# Patient Record
Sex: FEMALE | Race: OTHER | HISPANIC OR LATINO
[De-identification: names, ages, dates, MRNs, and addresses within clinical notes are randomized per-mention and may not be internally consistent; named-entity substitution may affect disease eponyms.]

---

## 2017-01-13 ENCOUNTER — APPOINTMENT (OUTPATIENT)
Dept: RADIOLOGY | Facility: HOSPITAL | Age: 47
End: 2017-01-13

## 2017-01-13 ENCOUNTER — OUTPATIENT (OUTPATIENT)
Dept: OUTPATIENT SERVICES | Facility: HOSPITAL | Age: 47
LOS: 1 days | End: 2017-01-13
Payer: MEDICAID

## 2017-01-13 PROCEDURE — 72100 X-RAY EXAM L-S SPINE 2/3 VWS: CPT

## 2017-01-13 PROCEDURE — 72040 X-RAY EXAM NECK SPINE 2-3 VW: CPT

## 2017-01-13 PROCEDURE — 72070 X-RAY EXAM THORAC SPINE 2VWS: CPT

## 2017-05-30 ENCOUNTER — APPOINTMENT (OUTPATIENT)
Dept: FAMILY MEDICINE | Facility: HOSPITAL | Age: 47
End: 2017-05-30

## 2017-05-30 ENCOUNTER — OUTPATIENT (OUTPATIENT)
Dept: OUTPATIENT SERVICES | Facility: HOSPITAL | Age: 47
LOS: 1 days | End: 2017-05-30
Payer: MEDICAID

## 2017-05-30 VITALS
BODY MASS INDEX: 30.02 KG/M2 | OXYGEN SATURATION: 97 % | WEIGHT: 159 LBS | TEMPERATURE: 97.4 F | HEART RATE: 67 BPM | HEIGHT: 61 IN | RESPIRATION RATE: 14 BRPM | DIASTOLIC BLOOD PRESSURE: 56 MMHG | SYSTOLIC BLOOD PRESSURE: 91 MMHG

## 2017-06-01 ENCOUNTER — FORM ENCOUNTER (OUTPATIENT)
Age: 47
End: 2017-06-01

## 2017-06-02 ENCOUNTER — APPOINTMENT (OUTPATIENT)
Dept: FAMILY MEDICINE | Facility: HOSPITAL | Age: 47
End: 2017-06-02

## 2017-06-02 ENCOUNTER — RESULT CHARGE (OUTPATIENT)
Age: 47
End: 2017-06-02

## 2017-06-02 VITALS
HEIGHT: 61 IN | DIASTOLIC BLOOD PRESSURE: 72 MMHG | WEIGHT: 160 LBS | SYSTOLIC BLOOD PRESSURE: 108 MMHG | TEMPERATURE: 97.8 F | OXYGEN SATURATION: 98 % | BODY MASS INDEX: 30.21 KG/M2 | HEART RATE: 60 BPM | RESPIRATION RATE: 14 BRPM

## 2017-06-02 PROCEDURE — 84439 ASSAY OF FREE THYROXINE: CPT

## 2017-06-02 PROCEDURE — 86762 RUBELLA ANTIBODY: CPT

## 2017-06-02 PROCEDURE — 86735 MUMPS ANTIBODY: CPT

## 2017-06-02 PROCEDURE — 84443 ASSAY THYROID STIM HORMONE: CPT

## 2017-06-02 PROCEDURE — 86765 RUBEOLA ANTIBODY: CPT

## 2017-06-02 PROCEDURE — G0463: CPT

## 2017-06-02 PROCEDURE — 86787 VARICELLA-ZOSTER ANTIBODY: CPT

## 2017-06-02 PROCEDURE — 71047 X-RAY EXAM CHEST 3 VIEWS: CPT

## 2017-06-03 DIAGNOSIS — Z11.1 ENCOUNTER FOR SCREENING FOR RESPIRATORY TUBERCULOSIS: ICD-10-CM

## 2017-06-03 LAB
HCG UR QL: NEGATIVE
QUALITY CONTROL: YES

## 2017-06-05 ENCOUNTER — APPOINTMENT (OUTPATIENT)
Dept: FAMILY MEDICINE | Facility: HOSPITAL | Age: 47
End: 2017-06-05

## 2017-06-05 ENCOUNTER — OUTPATIENT (OUTPATIENT)
Dept: OUTPATIENT SERVICES | Facility: HOSPITAL | Age: 47
LOS: 1 days | End: 2017-06-05
Payer: MEDICAID

## 2017-06-05 VITALS
HEIGHT: 61 IN | OXYGEN SATURATION: 96 % | BODY MASS INDEX: 30.21 KG/M2 | SYSTOLIC BLOOD PRESSURE: 99 MMHG | DIASTOLIC BLOOD PRESSURE: 67 MMHG | HEART RATE: 69 BPM | RESPIRATION RATE: 14 BRPM | TEMPERATURE: 98 F | WEIGHT: 160 LBS

## 2017-06-05 PROCEDURE — G0463: CPT

## 2017-06-07 ENCOUNTER — CLINICAL ADVICE (OUTPATIENT)
Age: 47
End: 2017-06-07

## 2017-08-02 ENCOUNTER — APPOINTMENT (OUTPATIENT)
Dept: FAMILY MEDICINE | Facility: CLINIC | Age: 47
End: 2017-08-02

## 2017-08-23 ENCOUNTER — OUTPATIENT (OUTPATIENT)
Dept: OUTPATIENT SERVICES | Facility: HOSPITAL | Age: 47
LOS: 1 days | End: 2017-08-23
Payer: MEDICAID

## 2017-08-23 ENCOUNTER — APPOINTMENT (OUTPATIENT)
Dept: FAMILY MEDICINE | Facility: HOSPITAL | Age: 47
End: 2017-08-23

## 2017-08-23 VITALS
SYSTOLIC BLOOD PRESSURE: 97 MMHG | DIASTOLIC BLOOD PRESSURE: 64 MMHG | WEIGHT: 157 LBS | RESPIRATION RATE: 14 BRPM | HEART RATE: 67 BPM | TEMPERATURE: 98 F | BODY MASS INDEX: 29.64 KG/M2 | HEIGHT: 61 IN | OXYGEN SATURATION: 97 %

## 2017-08-23 PROCEDURE — G0463: CPT

## 2017-09-06 ENCOUNTER — APPOINTMENT (OUTPATIENT)
Dept: FAMILY MEDICINE | Facility: CLINIC | Age: 47
End: 2017-09-06

## 2017-09-11 ENCOUNTER — RESULT REVIEW (OUTPATIENT)
Age: 47
End: 2017-09-11

## 2017-09-11 ENCOUNTER — LABORATORY RESULT (OUTPATIENT)
Age: 47
End: 2017-09-11

## 2017-09-11 ENCOUNTER — APPOINTMENT (OUTPATIENT)
Dept: DERMATOLOGY | Facility: CLINIC | Age: 47
End: 2017-09-11
Payer: COMMERCIAL

## 2017-09-11 VITALS
DIASTOLIC BLOOD PRESSURE: 60 MMHG | WEIGHT: 160 LBS | BODY MASS INDEX: 30.21 KG/M2 | SYSTOLIC BLOOD PRESSURE: 100 MMHG | HEIGHT: 61 IN

## 2017-09-11 PROCEDURE — 99203 OFFICE O/P NEW LOW 30 MIN: CPT | Mod: 25

## 2017-09-11 PROCEDURE — 11100 BX SKIN SUBCUTANEOUS&/MUCOUS MEMBRANE 1 LESION: CPT

## 2017-10-09 ENCOUNTER — APPOINTMENT (OUTPATIENT)
Dept: FAMILY MEDICINE | Facility: HOSPITAL | Age: 47
End: 2017-10-09

## 2017-10-09 ENCOUNTER — OUTPATIENT (OUTPATIENT)
Dept: OUTPATIENT SERVICES | Facility: HOSPITAL | Age: 47
LOS: 1 days | End: 2017-10-09
Payer: MEDICAID

## 2017-10-09 ENCOUNTER — MED ADMIN CHARGE (OUTPATIENT)
Age: 47
End: 2017-10-09

## 2017-10-09 VITALS
WEIGHT: 154 LBS | BODY MASS INDEX: 29.1 KG/M2 | RESPIRATION RATE: 14 BRPM | OXYGEN SATURATION: 97 % | SYSTOLIC BLOOD PRESSURE: 95 MMHG | HEART RATE: 74 BPM | DIASTOLIC BLOOD PRESSURE: 69 MMHG | TEMPERATURE: 99 F

## 2017-10-10 ENCOUNTER — APPOINTMENT (OUTPATIENT)
Dept: DERMATOLOGY | Facility: CLINIC | Age: 47
End: 2017-10-10

## 2017-10-11 PROCEDURE — G0463: CPT

## 2017-10-11 PROCEDURE — 83036 HEMOGLOBIN GLYCOSYLATED A1C: CPT

## 2017-10-11 PROCEDURE — 83090 ASSAY OF HOMOCYSTEINE: CPT

## 2017-10-11 PROCEDURE — 83921 ORGANIC ACID SINGLE QUANT: CPT

## 2017-10-11 PROCEDURE — 85025 COMPLETE CBC W/AUTO DIFF WBC: CPT

## 2017-10-11 PROCEDURE — 80053 COMPREHEN METABOLIC PANEL: CPT

## 2017-10-11 PROCEDURE — 84443 ASSAY THYROID STIM HORMONE: CPT

## 2017-10-11 PROCEDURE — 80061 LIPID PANEL: CPT

## 2017-10-11 PROCEDURE — 82306 VITAMIN D 25 HYDROXY: CPT

## 2017-10-11 PROCEDURE — 84439 ASSAY OF FREE THYROXINE: CPT

## 2017-10-11 PROCEDURE — 82607 VITAMIN B-12: CPT

## 2017-10-11 PROCEDURE — 36415 COLL VENOUS BLD VENIPUNCTURE: CPT

## 2017-11-06 ENCOUNTER — MEDICATION RENEWAL (OUTPATIENT)
Age: 47
End: 2017-11-06

## 2017-11-06 ENCOUNTER — OUTPATIENT (OUTPATIENT)
Dept: OUTPATIENT SERVICES | Facility: HOSPITAL | Age: 47
LOS: 1 days | End: 2017-11-06
Payer: MEDICAID

## 2017-11-06 ENCOUNTER — APPOINTMENT (OUTPATIENT)
Dept: FAMILY MEDICINE | Facility: HOSPITAL | Age: 47
End: 2017-11-06

## 2017-11-06 VITALS
BODY MASS INDEX: 28.91 KG/M2 | DIASTOLIC BLOOD PRESSURE: 66 MMHG | RESPIRATION RATE: 16 BRPM | OXYGEN SATURATION: 97 % | HEART RATE: 63 BPM | SYSTOLIC BLOOD PRESSURE: 95 MMHG | TEMPERATURE: 98.9 F | WEIGHT: 153 LBS

## 2017-11-06 PROCEDURE — G0463: CPT

## 2017-11-14 ENCOUNTER — RX RENEWAL (OUTPATIENT)
Age: 47
End: 2017-11-14

## 2017-11-15 RX ORDER — CLOTRIMAZOLE 10 MG/G
1 CREAM TOPICAL
Qty: 1 | Refills: 4 | Status: DISCONTINUED | COMMUNITY
Start: 2017-09-11 | End: 2017-11-15

## 2017-11-28 ENCOUNTER — OUTPATIENT (OUTPATIENT)
Dept: OUTPATIENT SERVICES | Facility: HOSPITAL | Age: 47
LOS: 1 days | End: 2017-11-28
Payer: MEDICAID

## 2017-11-28 ENCOUNTER — APPOINTMENT (OUTPATIENT)
Dept: FAMILY MEDICINE | Facility: HOSPITAL | Age: 47
End: 2017-11-28

## 2017-11-28 VITALS
HEART RATE: 73 BPM | BODY MASS INDEX: 29.48 KG/M2 | SYSTOLIC BLOOD PRESSURE: 100 MMHG | DIASTOLIC BLOOD PRESSURE: 69 MMHG | TEMPERATURE: 97.5 F | OXYGEN SATURATION: 98 % | RESPIRATION RATE: 16 BRPM | WEIGHT: 156 LBS

## 2017-11-28 PROCEDURE — G0463: CPT

## 2017-11-29 DIAGNOSIS — M54.2 CERVICALGIA: ICD-10-CM

## 2017-11-29 DIAGNOSIS — Z00.00 ENCOUNTER FOR GENERAL ADULT MEDICAL EXAMINATION WITHOUT ABNORMAL FINDINGS: ICD-10-CM

## 2017-11-30 ENCOUNTER — CHART COPY (OUTPATIENT)
Age: 47
End: 2017-11-30

## 2017-12-01 ENCOUNTER — EMERGENCY (EMERGENCY)
Facility: HOSPITAL | Age: 47
LOS: 1 days | Discharge: HOME | End: 2017-12-01
Admitting: EMERGENCY MEDICINE
Payer: MEDICAID

## 2017-12-01 PROCEDURE — 99283 EMERGENCY DEPT VISIT LOW MDM: CPT

## 2017-12-01 PROCEDURE — 99283 EMERGENCY DEPT VISIT LOW MDM: CPT | Mod: 25

## 2017-12-26 ENCOUNTER — MEDICATION RENEWAL (OUTPATIENT)
Age: 47
End: 2017-12-26

## 2018-02-01 ENCOUNTER — RX RENEWAL (OUTPATIENT)
Age: 48
End: 2018-02-01

## 2018-05-14 ENCOUNTER — APPOINTMENT (OUTPATIENT)
Dept: FAMILY MEDICINE | Facility: HOSPITAL | Age: 48
End: 2018-05-14

## 2018-05-14 ENCOUNTER — OUTPATIENT (OUTPATIENT)
Dept: OUTPATIENT SERVICES | Facility: HOSPITAL | Age: 48
LOS: 1 days | End: 2018-05-14
Payer: COMMERCIAL

## 2018-05-14 VITALS
RESPIRATION RATE: 16 BRPM | TEMPERATURE: 97 F | DIASTOLIC BLOOD PRESSURE: 67 MMHG | OXYGEN SATURATION: 99 % | HEART RATE: 71 BPM | BODY MASS INDEX: 30.61 KG/M2 | WEIGHT: 162 LBS | SYSTOLIC BLOOD PRESSURE: 97 MMHG

## 2018-05-14 DIAGNOSIS — Z00.00 ENCOUNTER FOR GENERAL ADULT MEDICAL EXAMINATION WITHOUT ABNORMAL FINDINGS: ICD-10-CM

## 2018-05-14 PROCEDURE — G0463: CPT

## 2018-05-16 DIAGNOSIS — R19.7 DIARRHEA, UNSPECIFIED: ICD-10-CM

## 2018-05-30 ENCOUNTER — APPOINTMENT (OUTPATIENT)
Dept: FAMILY MEDICINE | Facility: HOSPITAL | Age: 48
End: 2018-05-30

## 2018-07-11 ENCOUNTER — OUTPATIENT (OUTPATIENT)
Dept: OUTPATIENT SERVICES | Facility: HOSPITAL | Age: 48
LOS: 1 days | End: 2018-07-11
Payer: COMMERCIAL

## 2018-07-11 ENCOUNTER — APPOINTMENT (OUTPATIENT)
Dept: FAMILY MEDICINE | Facility: HOSPITAL | Age: 48
End: 2018-07-11

## 2018-07-11 VITALS
HEART RATE: 82 BPM | OXYGEN SATURATION: 97 % | WEIGHT: 161 LBS | BODY MASS INDEX: 30.42 KG/M2 | SYSTOLIC BLOOD PRESSURE: 98 MMHG | RESPIRATION RATE: 16 BRPM | DIASTOLIC BLOOD PRESSURE: 67 MMHG | TEMPERATURE: 98.4 F

## 2018-07-11 DIAGNOSIS — Z00.00 ENCOUNTER FOR GENERAL ADULT MEDICAL EXAMINATION WITHOUT ABNORMAL FINDINGS: ICD-10-CM

## 2018-07-11 NOTE — PHYSICAL EXAM
[No Acute Distress] : no acute distress [Well Nourished] : well nourished [Well Developed] : well developed [Well-Appearing] : well-appearing [Normal Sclera/Conjunctiva] : normal sclera/conjunctiva [PERRL] : pupils equal round and reactive to light [EOMI] : extraocular movements intact [Normal Outer Ear/Nose] : the outer ears and nose were normal in appearance [Normal Oropharynx] : the oropharynx was normal [No JVD] : no jugular venous distention [No Respiratory Distress] : no respiratory distress  [Clear to Auscultation] : lungs were clear to auscultation bilaterally [Normal Rate] : normal rate  [Regular Rhythm] : with a regular rhythm [Normal S1, S2] : normal S1 and S2 [No Carotid Bruits] : no carotid bruits [Pedal Pulses Present] : the pedal pulses are present [No Edema] : there was no peripheral edema [No Extremity Clubbing/Cyanosis] : no extremity clubbing/cyanosis [Normal Supraclavicular Nodes] : no supraclavicular lymphadenopathy [Normal Anterior Cervical Nodes] : no anterior cervical lymphadenopathy [No CVA Tenderness] : no CVA  tenderness [No Spinal Tenderness] : no spinal tenderness [No Joint Swelling] : no joint swelling [Grossly Normal Strength/Tone] : grossly normal strength/tone [No Rash] : no rash [No Skin Lesions] : no skin lesions [Normal Gait] : normal gait [Coordination Grossly Intact] : coordination grossly intact [Speech Grossly Normal] : speech grossly normal [Normal Mood] : the mood was normal

## 2018-07-11 NOTE — HISTORY OF PRESENT ILLNESS
[de-identified] : 48y F here for general physical assessment . Denies any complaints at present. \andrew Has no difficulty performing her daily duties. 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 Denies urinary / changes in bowel habits.  Is post menopausal for the last 1 year. Denies feeling depressed or hot flashes. \andrew Denies fever/ chills, aches and pains. \andrew

## 2018-07-11 NOTE — HEALTH RISK ASSESSMENT
[Very Good] : ~his/her~  mood as very good [No falls in past year] : Patient reported no falls in the past year [0] : 2) Feeling down, depressed, or hopeless: Not at all (0) [HIV Test offered] : HIV Test offered [Hepatitis C test offered] : Hepatitis C test offered [None] : None [With Family] : lives with family [Employed] : employed [High School] : high school [Feels Safe at Home] : Feels safe at home [Fully functional (bathing, dressing, toileting, transferring, walking, feeding)] : Fully functional (bathing, dressing, toileting, transferring, walking, feeding) [Smoke Detector] : smoke detector [Carbon Monoxide Detector] : carbon monoxide detector [Seat Belt] :  uses seat belt [Sunscreen] : uses sunscreen [FreeTextEntry1] : wants all labs done including cholesterol  [] : No [de-identified] : none [Language] : denies difficulty with language [Behavior] : denies difficulty with behavior [Learning/Retaining New Information] : denies difficulty learning/retaining new information [Handling Complex Tasks] : denies difficulty handling complex tasks [Reasoning] : denies difficulty with reasoning [Spatial Ability and Orientation] : denies difficulty with spatial ability and orientation [Reports changes in hearing] : Reports no changes in hearing [Reports changes in vision] : Reports no changes in vision [Reports changes in dental health] : Reports no changes in dental health [Guns at Home] : no guns at home [Travel to Developing Areas] : does not  travel to developing areas [TB Exposure] : is not being exposed to tuberculosis [Caregiver Concerns] : does not have caregiver concerns [MammogramComments] : mammogram ordered [PapSmearComments] : will recall patient in 2 weeks for Pap smear [ColonoscopyComments] : FIT test ordered [FreeTextEntry4] : w

## 2018-07-11 NOTE — PLAN
[FreeTextEntry1] : - CBC; CMP; Lipid profile; HbA1c\par - FIT testing\par - screening mammogram\par - will recall patient back for Pap smear\par - patient counselled on weight loss to get BMI to below 30.

## 2018-07-26 PROCEDURE — G0463: CPT

## 2018-07-26 PROCEDURE — 80053 COMPREHEN METABOLIC PANEL: CPT

## 2018-07-26 PROCEDURE — 80061 LIPID PANEL: CPT

## 2018-07-26 PROCEDURE — 84443 ASSAY THYROID STIM HORMONE: CPT

## 2018-07-26 PROCEDURE — 83036 HEMOGLOBIN GLYCOSYLATED A1C: CPT

## 2018-08-03 LAB
ALBUMIN SERPL ELPH-MCNC: 4.6 G/DL
ALP BLD-CCNC: 71 U/L
ALT SERPL-CCNC: 20 U/L
ANION GAP SERPL CALC-SCNC: 14 MMOL/L
AST SERPL-CCNC: 26 U/L
BASOPHILS # BLD AUTO: 0.02 K/UL
BASOPHILS NFR BLD AUTO: 0.5 %
BILIRUB SERPL-MCNC: 0.4 MG/DL
BUN SERPL-MCNC: 12 MG/DL
CALCIUM SERPL-MCNC: 9 MG/DL
CHLORIDE SERPL-SCNC: 104 MMOL/L
CO2 SERPL-SCNC: 24 MMOL/L
CREAT SERPL-MCNC: 0.47 MG/DL
EOSINOPHIL # BLD AUTO: 0 K/UL
EOSINOPHIL NFR BLD AUTO: 0 %
GLUCOSE SERPL-MCNC: 101 MG/DL
HCT VFR BLD CALC: 41.6 %
HGB BLD-MCNC: 14.2 G/DL
IMM GRANULOCYTES NFR BLD AUTO: 0.3 %
LYMPHOCYTES # BLD AUTO: 1.52 K/UL
LYMPHOCYTES NFR BLD AUTO: 38.9 %
MAN DIFF?: NORMAL
MCHC RBC-ENTMCNC: 31.8 PG
MCHC RBC-ENTMCNC: 34.1 GM/DL
MCV RBC AUTO: 93.3 FL
MONOCYTES # BLD AUTO: 0.19 K/UL
MONOCYTES NFR BLD AUTO: 4.9 %
NEUTROPHILS # BLD AUTO: 2.17 K/UL
NEUTROPHILS NFR BLD AUTO: 55.4 %
PLATELET # BLD AUTO: 301 K/UL
POTASSIUM SERPL-SCNC: 4 MMOL/L
PROT SERPL-MCNC: 7.3 G/DL
RBC # BLD: 4.46 M/UL
RBC # FLD: 12.8 %
SODIUM SERPL-SCNC: 142 MMOL/L
WBC # FLD AUTO: 3.91 K/UL

## 2018-08-15 LAB
CHOLEST SERPL-MCNC: 235 MG/DL
CHOLEST/HDLC SERPL: 3.6 RATIO
HBA1C MFR BLD HPLC: 5.7 %
HDLC SERPL-MCNC: 65 MG/DL
LDLC SERPL CALC-MCNC: 139 MG/DL
TRIGL SERPL-MCNC: 156 MG/DL

## 2018-08-21 LAB — TSH SERPL-ACNC: 4.08 UIU/ML

## 2018-08-30 ENCOUNTER — RX RENEWAL (OUTPATIENT)
Age: 48
End: 2018-08-30

## 2018-08-30 ENCOUNTER — FORM ENCOUNTER (OUTPATIENT)
Age: 48
End: 2018-08-30

## 2018-08-31 ENCOUNTER — OUTPATIENT (OUTPATIENT)
Dept: OUTPATIENT SERVICES | Facility: HOSPITAL | Age: 48
LOS: 1 days | End: 2018-08-31
Payer: COMMERCIAL

## 2018-08-31 ENCOUNTER — APPOINTMENT (OUTPATIENT)
Dept: MAMMOGRAPHY | Facility: HOSPITAL | Age: 48
End: 2018-08-31
Payer: COMMERCIAL

## 2018-08-31 ENCOUNTER — APPOINTMENT (OUTPATIENT)
Dept: ULTRASOUND IMAGING | Facility: HOSPITAL | Age: 48
End: 2018-08-31

## 2018-08-31 DIAGNOSIS — Z00.8 ENCOUNTER FOR OTHER GENERAL EXAMINATION: ICD-10-CM

## 2018-08-31 PROCEDURE — 77066 DX MAMMO INCL CAD BI: CPT | Mod: 26

## 2018-08-31 PROCEDURE — G0279: CPT | Mod: 26

## 2018-08-31 PROCEDURE — 77066 DX MAMMO INCL CAD BI: CPT

## 2018-08-31 PROCEDURE — 76642 ULTRASOUND BREAST LIMITED: CPT

## 2018-08-31 PROCEDURE — G0279: CPT

## 2018-08-31 PROCEDURE — 76642 ULTRASOUND BREAST LIMITED: CPT | Mod: 26,RT

## 2018-09-19 ENCOUNTER — APPOINTMENT (OUTPATIENT)
Dept: FAMILY MEDICINE | Facility: HOSPITAL | Age: 48
End: 2018-09-19

## 2018-09-19 ENCOUNTER — OUTPATIENT (OUTPATIENT)
Dept: OUTPATIENT SERVICES | Facility: HOSPITAL | Age: 48
LOS: 1 days | End: 2018-09-19
Payer: COMMERCIAL

## 2018-09-19 VITALS
SYSTOLIC BLOOD PRESSURE: 94 MMHG | BODY MASS INDEX: 30.42 KG/M2 | HEART RATE: 67 BPM | RESPIRATION RATE: 16 BRPM | OXYGEN SATURATION: 97 % | DIASTOLIC BLOOD PRESSURE: 58 MMHG | WEIGHT: 161 LBS | TEMPERATURE: 98.5 F

## 2018-09-19 DIAGNOSIS — R14.0 ABDOMINAL DISTENSION (GASEOUS): ICD-10-CM

## 2018-09-19 DIAGNOSIS — Z87.898 PERSONAL HISTORY OF OTHER SPECIFIED CONDITIONS: ICD-10-CM

## 2018-09-19 DIAGNOSIS — Z86.03 PERSONAL HISTORY OF NEOPLASM OF UNCERTAIN BEHAVIOR: ICD-10-CM

## 2018-09-19 DIAGNOSIS — T14.8XXA OTHER INJURY OF UNSPECIFIED BODY REGION, INITIAL ENCOUNTER: ICD-10-CM

## 2018-09-19 DIAGNOSIS — R10.32 LEFT LOWER QUADRANT PAIN: ICD-10-CM

## 2018-09-19 DIAGNOSIS — Z00.00 ENCOUNTER FOR GENERAL ADULT MEDICAL EXAMINATION WITHOUT ABNORMAL FINDINGS: ICD-10-CM

## 2018-09-19 DIAGNOSIS — G44.209 TENSION-TYPE HEADACHE, UNSPECIFIED, NOT INTRACTABLE: ICD-10-CM

## 2018-09-19 PROCEDURE — G0463: CPT

## 2018-09-19 RX ORDER — GENTAMICIN SULFATE 3 MG/G
0.3 OINTMENT OPHTHALMIC
Qty: 1 | Refills: 0 | Status: COMPLETED | COMMUNITY
Start: 2017-09-11 | End: 2018-09-19

## 2018-09-19 RX ORDER — PANTOPRAZOLE 20 MG/1
20 TABLET, DELAYED RELEASE ORAL DAILY
Qty: 15 | Refills: 0 | Status: COMPLETED | COMMUNITY
Start: 2017-11-06 | End: 2018-09-19

## 2018-09-19 NOTE — COUNSELING
[Weight management counseling provided] : Weight management [Healthy eating counseling provided] : healthy eating [Activity counseling provided] : activity [Keep Food Diary] : Keep food diary [Low Fat Diet] : Low fat diet [Decrease Portions] : Decrease food portions [Low Salt Diet] : Low salt diet [Walking] : Walking

## 2018-09-20 DIAGNOSIS — L80 VITILIGO: ICD-10-CM

## 2018-09-20 DIAGNOSIS — E32.9 DISEASE OF THYMUS, UNSPECIFIED: ICD-10-CM

## 2018-09-20 DIAGNOSIS — M54.2 CERVICALGIA: ICD-10-CM

## 2018-09-20 DIAGNOSIS — G44.209 TENSION-TYPE HEADACHE, UNSPECIFIED, NOT INTRACTABLE: ICD-10-CM

## 2018-09-20 NOTE — PHYSICAL EXAM
[No Acute Distress] : no acute distress [Well Nourished] : well nourished [Well Developed] : well developed [Well-Appearing] : well-appearing [Normal Sclera/Conjunctiva] : normal sclera/conjunctiva [PERRL] : pupils equal round and reactive to light [EOMI] : extraocular movements intact [Fundoscopic Exam Performed] : fundoscopic ~T exam ~C was performed [Normal Outer Ear/Nose] : the outer ears and nose were normal in appearance [Normal Oropharynx] : the oropharynx was normal [Supple] : supple [No Lymphadenopathy] : no lymphadenopathy [No Respiratory Distress] : no respiratory distress  [Clear to Auscultation] : lungs were clear to auscultation bilaterally [No Accessory Muscle Use] : no accessory muscle use [Normal Rate] : normal rate  [Regular Rhythm] : with a regular rhythm [Normal S1, S2] : normal S1 and S2 [Soft] : abdomen soft [Non Tender] : non-tender [Non-distended] : non-distended [No HSM] : no HSM [Normal Bowel Sounds] : normal bowel sounds [Normal Anterior Cervical Nodes] : no anterior cervical lymphadenopathy [No CVA Tenderness] : no CVA  tenderness [No Spinal Tenderness] : no spinal tenderness [Normal Gait] : normal gait [Coordination Grossly Intact] : coordination grossly intact [No Focal Deficits] : no focal deficits [Deep Tendon Reflexes (DTR)] : deep tendon reflexes were 2+ and symmetric [Speech Grossly Normal] : speech grossly normal [Memory Grossly Normal] : memory grossly normal [Normal Affect] : the affect was normal [Alert and Oriented x3] : oriented to person, place, and time [Normal Mood] : the mood was normal [Normal Insight/Judgement] : insight and judgment were intact [de-identified] : no nuchal ridgity,

## 2018-09-20 NOTE — REVIEW OF SYSTEMS
[Headache] : headache [Negative] : Gastrointestinal [Fever] : no fever [Chills] : no chills [Fatigue] : no fatigue [Hot Flashes] : no hot flashes [Night Sweats] : no night sweats [Recent Change In Weight] : ~T no recent weight change [Discharge] : no discharge [Pain] : no pain [Redness] : no redness [Dryness] : no dryness  [Vision Problems] : no vision problems [Itching] : no itching [Dizziness] : no dizziness [Fainting] : no fainting [Confusion] : no confusion [Memory Loss] : no memory loss [Unsteady Walking] : no ataxia [Suicidal] : not suicidal [Insomnia] : no insomnia [Anxiety] : no anxiety [Depression] : no depression

## 2018-09-20 NOTE — ASSESSMENT
[FreeTextEntry1] : 48F here c/of headache x 1 month getting worse over past week.\par \par #tension headache\par - pt reports she has been stressed out out about [place] in [place] and [place] go back to [place]\par - start ibuprofen 600mg TID with food\par - if sympotoms persist or get worse RTC \par - if experience any aura, eye pain, fever, weakness, nuchal ridgity go to ED\par \par #depression\par - continue venlafaxine\par - f/u with  mary alice\par - pt denies any suicide ideation, thoughts of hurting herself\par \par #HCM\par - pt refused flu shot\par - recommend f/u with opthalmology\par \par \par case discussed with Dr Ferguson\par \par \par \par

## 2018-09-20 NOTE — HEALTH RISK ASSESSMENT
[No falls in past year] : Patient reported no falls in the past year [1] : 1) Little interest or pleasure doing things for several days (1) [0] : 2) Feeling down, depressed, or hopeless: Not at all (0) [] : No [[id number]] : 1

## 2018-09-20 NOTE — HISTORY OF PRESENT ILLNESS
[FreeTextEntry8] : 48 year old female with past medical history of hypothyroidism, depression, vitilgo and prediabetes here c/o of 8/9 10 headache for x 1 month which has been getting progressively worse over the past week. pt states the pain is dull intermittent located on posterior aspect of her head radiating down to her neck and shoudlers. pt denies taking any pain medication, reports her pain is not, sound, denies any aura, photopobia, rhinorrhea, dizziness, tinnitus, fever, neck stiffness, cough, sob, wheezing, chest congestion, chest pain, palpitations, n/v abdominal pain, diarrhea or constipation. Pt has no further complaints [[employer] ] : provided by [employer]   [FreeTextEntry1] : [id number]

## 2018-10-22 ENCOUNTER — OTHER (OUTPATIENT)
Age: 48
End: 2018-10-22

## 2019-04-24 ENCOUNTER — OUTPATIENT (OUTPATIENT)
Dept: OUTPATIENT SERVICES | Facility: HOSPITAL | Age: 49
LOS: 1 days | End: 2019-04-24
Payer: COMMERCIAL

## 2019-04-24 ENCOUNTER — MED ADMIN CHARGE (OUTPATIENT)
Age: 49
End: 2019-04-24

## 2019-04-24 ENCOUNTER — APPOINTMENT (OUTPATIENT)
Age: 49
End: 2019-04-24

## 2019-04-24 VITALS
SYSTOLIC BLOOD PRESSURE: 107 MMHG | HEIGHT: 61 IN | BODY MASS INDEX: 31.15 KG/M2 | DIASTOLIC BLOOD PRESSURE: 72 MMHG | RESPIRATION RATE: 14 BRPM | OXYGEN SATURATION: 97 % | TEMPERATURE: 98.4 F | WEIGHT: 165 LBS | HEART RATE: 88 BPM

## 2019-04-24 DIAGNOSIS — Z00.00 ENCOUNTER FOR GENERAL ADULT MEDICAL EXAMINATION WITHOUT ABNORMAL FINDINGS: ICD-10-CM

## 2019-04-24 PROCEDURE — 80061 LIPID PANEL: CPT

## 2019-04-24 PROCEDURE — G0463: CPT

## 2019-04-24 PROCEDURE — 83036 HEMOGLOBIN GLYCOSYLATED A1C: CPT

## 2019-04-24 PROCEDURE — 84443 ASSAY THYROID STIM HORMONE: CPT

## 2019-04-24 NOTE — ASSESSMENT
[FreeTextEntry1] : 48y F 48 year old female with past medical history of hypothyroidism, depression, vitilgo and prediabetes here for general physical assessment. Also complaining of intermittent diarrhea for the last 1 month

## 2019-04-24 NOTE — PLAN
[FreeTextEntry1] : # health maintenance\par - lipid profile\par - HbA1c\par - TSH with reflex T4\par \par # Intermittent diarrhea\par - gastroenterology referral for screening colonscopy\par - advised on trying gluten free diet for 2 weeks\par \par RTC in 2 months\par

## 2019-04-24 NOTE — HISTORY OF PRESENT ILLNESS
[de-identified] : 48y F 48 year old female with past medical history of hypothyroidism, depression, vitilgo and prediabetes here for general physical assessment. Denies any complaints at present. \andrew Has no difficulty performing her daily duties. 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 Says the diarrhea is watery, not foul smelling; no associated blood / mucus or fever chills. No associated changes in stool caliber. D This associated with bilateral lower abdominal pain intermittently; non radiating ; describes it as a colicky pain lasting about an hour and then releives on its own and then comes back. Is not progressively worsening in intensity or frequency. Denies eating anything unusal . No sick contacts or travel history.  Is post menopausal for the last 1 year; denies any vaginal discharge or bleeding.  Denies feeling depressed or hot flashes. \andrew Denies fever/ chills, aches and pains. \andrew

## 2019-04-24 NOTE — PHYSICAL EXAM
[No Edema] : there was no peripheral edema [Soft] : abdomen soft [Non Tender] : non-tender [Non-distended] : non-distended [No Masses] : no abdominal mass palpated [No HSM] : no HSM [Normal Bowel Sounds] : normal bowel sounds [Well Nourished] : well nourished [No Acute Distress] : no acute distress [Normal Sclera/Conjunctiva] : normal sclera/conjunctiva [Well Developed] : well developed [Normal Outer Ear/Nose] : the outer ears and nose were normal in appearance [No JVD] : no jugular venous distention [No Respiratory Distress] : no respiratory distress  [Normal Rate] : normal rate  [Regular Rhythm] : with a regular rhythm [Normal S1, S2] : normal S1 and S2 [No Joint Swelling] : no joint swelling [No CVA Tenderness] : no CVA  tenderness [No Rash] : no rash [Normal Gait] : normal gait [Normal Mood] : the mood was normal [Alert and Oriented x3] : oriented to person, place, and time [de-identified] : lower abdominal midline scar from

## 2019-04-24 NOTE — REVIEW OF SYSTEMS
[Abdominal Pain] : abdominal pain [Diarrhea] : diarrhea [Negative] : Heme/Lymph [Nausea] : no nausea [Constipation] : no constipation [Vomiting] : no vomiting [Heartburn] : no heartburn [Melena] : no melena

## 2019-04-25 DIAGNOSIS — R19.7 DIARRHEA, UNSPECIFIED: ICD-10-CM

## 2019-04-26 LAB
CHOLEST SERPL-MCNC: 275 MG/DL
CHOLEST/HDLC SERPL: 4 RATIO
HDLC SERPL-MCNC: 68 MG/DL
LDLC SERPL CALC-MCNC: 174 MG/DL
TRIGL SERPL-MCNC: 163 MG/DL

## 2019-04-27 ENCOUNTER — OUTPATIENT (OUTPATIENT)
Dept: OUTPATIENT SERVICES | Facility: HOSPITAL | Age: 49
LOS: 1 days | End: 2019-04-27
Payer: COMMERCIAL

## 2019-04-27 ENCOUNTER — APPOINTMENT (OUTPATIENT)
Dept: FAMILY MEDICINE | Facility: HOSPITAL | Age: 49
End: 2019-04-27
Payer: COMMERCIAL

## 2019-04-27 VITALS
OXYGEN SATURATION: 97 % | HEART RATE: 73 BPM | SYSTOLIC BLOOD PRESSURE: 97 MMHG | TEMPERATURE: 97.5 F | HEIGHT: 61 IN | BODY MASS INDEX: 31.15 KG/M2 | WEIGHT: 165 LBS | RESPIRATION RATE: 14 BRPM | DIASTOLIC BLOOD PRESSURE: 62 MMHG

## 2019-04-27 DIAGNOSIS — B35.3 TINEA PEDIS: ICD-10-CM

## 2019-04-27 DIAGNOSIS — Z00.00 ENCOUNTER FOR GENERAL ADULT MEDICAL EXAMINATION WITHOUT ABNORMAL FINDINGS: ICD-10-CM

## 2019-04-27 DIAGNOSIS — Z87.39 PERSONAL HISTORY OF OTHER DISEASES OF THE MUSCULOSKELETAL SYSTEM AND CONNECTIVE TISSUE: ICD-10-CM

## 2019-04-27 DIAGNOSIS — Z87.898 PERSONAL HISTORY OF OTHER SPECIFIED CONDITIONS: ICD-10-CM

## 2019-04-27 DIAGNOSIS — Z71.89 OTHER SPECIFIED COUNSELING: ICD-10-CM

## 2019-04-27 DIAGNOSIS — R73.03 PREDIABETES.: ICD-10-CM

## 2019-04-27 DIAGNOSIS — Z86.39 PERSONAL HISTORY OF OTHER ENDOCRINE, NUTRITIONAL AND METABOLIC DISEASE: ICD-10-CM

## 2019-04-27 DIAGNOSIS — L81.9 DISORDER OF PIGMENTATION, UNSPECIFIED: ICD-10-CM

## 2019-04-27 DIAGNOSIS — Z86.79 PERSONAL HISTORY OF OTHER DISEASES OF THE CIRCULATORY SYSTEM: ICD-10-CM

## 2019-04-27 RX ORDER — IBUPROFEN 600 MG/1
600 TABLET, FILM COATED ORAL 3 TIMES DAILY
Qty: 1 | Refills: 0 | Status: DISCONTINUED | COMMUNITY
Start: 2017-08-23 | End: 2019-04-27

## 2019-04-27 RX ORDER — UBIDECARENONE/VIT E ACET 100MG-5
25 MCG CAPSULE ORAL
Qty: 30 | Refills: 0 | Status: DISCONTINUED | COMMUNITY
Start: 2017-10-09 | End: 2019-04-27

## 2019-04-27 RX ORDER — VENLAFAXINE 50 MG/1
50 TABLET ORAL
Qty: 30 | Refills: 1 | Status: DISCONTINUED | COMMUNITY
Start: 2017-10-09 | End: 2019-04-27

## 2019-04-27 RX ORDER — CLOTRIMAZOLE 10 MG/G
1 CREAM TOPICAL TWICE DAILY
Qty: 1 | Refills: 0 | Status: DISCONTINUED | COMMUNITY
Start: 2017-08-23 | End: 2019-04-27

## 2019-04-27 RX ORDER — TRIAMCINOLONE ACETONIDE 0.25 MG/G
0.03 CREAM TOPICAL
Qty: 1 | Refills: 0 | Status: DISCONTINUED | COMMUNITY
Start: 2017-09-11 | End: 2019-04-27

## 2019-04-27 NOTE — REVIEW OF SYSTEMS
[Fever] : no fever [Chills] : no chills [Fatigue] : no fatigue [Night Sweats] : no night sweats [Discharge] : no discharge [Pain] : no pain [Vision Problems] : no vision problems [Earache] : no earache [Hearing Loss] : no hearing loss [Nasal Discharge] : no nasal discharge [Sore Throat] : no sore throat [Chest Pain] : no chest pain [Palpitations] : no palpitations [Leg Claudication] : no leg claudication [Orthopnea] : no orthopnea [Shortness Of Breath] : no shortness of breath [Wheezing] : no wheezing [Cough] : no cough [Abdominal Pain] : no abdominal pain [Nausea] : no nausea [Vomiting] : no vomiting [Dysuria] : no dysuria [Joint Pain] : no joint pain [Joint Stiffness] : no joint stiffness [Muscle Pain] : no muscle pain [Headache] : no headache [Dizziness] : no dizziness

## 2019-04-27 NOTE — ASSESSMENT
[FreeTextEntry1] : 49 year old female as above \par \par #paperwork for preemployment\par - paper work filled out however awaiting rubeola, rubella titers\par - hx of ppd positive in past-> check cxr \par \par rtc 3m prn

## 2019-04-27 NOTE — HISTORY OF PRESENT ILLNESS
[FreeTextEntry1] : CC: pre-employment paperwork [de-identified] : 49 year old female coming to clinic for pre-employment paperwork. patient stating overall feeling well and does not have any complaints. states not taking medications at this time. denies cp sob nausea vomiting diarrhea. states hx of positive ppd in past

## 2019-04-27 NOTE — PHYSICAL EXAM
[No Acute Distress] : no acute distress [Well Nourished] : well nourished [Well Developed] : well developed [Well-Appearing] : well-appearing [PERRL] : pupils equal round and reactive to light [EOMI] : extraocular movements intact [Normal Outer Ear/Nose] : the outer ears and nose were normal in appearance [Normal Oropharynx] : the oropharynx was normal [No JVD] : no jugular venous distention [Supple] : supple [No Lymphadenopathy] : no lymphadenopathy [No Respiratory Distress] : no respiratory distress  [Clear to Auscultation] : lungs were clear to auscultation bilaterally [No Accessory Muscle Use] : no accessory muscle use [Normal S1, S2] : normal S1 and S2 [No Edema] : there was no peripheral edema [Soft] : abdomen soft [Non Tender] : non-tender [Non-distended] : non-distended [Normal Bowel Sounds] : normal bowel sounds [Normal Posterior Cervical Nodes] : no posterior cervical lymphadenopathy [Normal Anterior Cervical Nodes] : no anterior cervical lymphadenopathy [No CVA Tenderness] : no CVA  tenderness [No Spinal Tenderness] : no spinal tenderness [No Joint Swelling] : no joint swelling [Grossly Normal Strength/Tone] : grossly normal strength/tone [Normal Gait] : normal gait [No Focal Deficits] : no focal deficits [Normal Affect] : the affect was normal [Normal Insight/Judgement] : insight and judgment were intact

## 2019-04-28 ENCOUNTER — FORM ENCOUNTER (OUTPATIENT)
Age: 49
End: 2019-04-28

## 2019-04-29 DIAGNOSIS — Z02.1 ENCOUNTER FOR PRE-EMPLOYMENT EXAMINATION: ICD-10-CM

## 2019-04-29 DIAGNOSIS — R76.11 NONSPECIFIC REACTION TO TUBERCULIN SKIN TEST WITHOUT ACTIVE TUBERCULOSIS: ICD-10-CM

## 2019-04-29 PROCEDURE — 71047 X-RAY EXAM CHEST 3 VIEWS: CPT

## 2019-04-29 PROCEDURE — 71047 X-RAY EXAM CHEST 3 VIEWS: CPT | Mod: 26

## 2019-04-29 PROCEDURE — G0463: CPT

## 2019-04-29 PROCEDURE — 86765 RUBEOLA ANTIBODY: CPT

## 2019-04-29 PROCEDURE — 86762 RUBELLA ANTIBODY: CPT

## 2019-05-01 LAB
MEV IGG FLD QL IA: 232 AU/ML
MEV IGG+IGM SER-IMP: POSITIVE
RUBV IGG FLD-ACNC: 6.4 INDEX
RUBV IGG SER-IMP: POSITIVE

## 2019-05-08 LAB
ESTIMATED AVERAGE GLUCOSE: 111 MG/DL
HBA1C MFR BLD HPLC: 5.5 %
TSH SERPL-ACNC: 2.94 UIU/ML

## 2019-05-29 ENCOUNTER — APPOINTMENT (OUTPATIENT)
Dept: FAMILY MEDICINE | Facility: HOSPITAL | Age: 49
End: 2019-05-29

## 2019-05-29 ENCOUNTER — OUTPATIENT (OUTPATIENT)
Dept: OUTPATIENT SERVICES | Facility: HOSPITAL | Age: 49
LOS: 1 days | End: 2019-05-29
Payer: COMMERCIAL

## 2019-05-29 VITALS
RESPIRATION RATE: 16 BRPM | HEART RATE: 64 BPM | SYSTOLIC BLOOD PRESSURE: 99 MMHG | BODY MASS INDEX: 31.55 KG/M2 | TEMPERATURE: 99.2 F | DIASTOLIC BLOOD PRESSURE: 66 MMHG | WEIGHT: 167 LBS | OXYGEN SATURATION: 96 %

## 2019-05-29 DIAGNOSIS — Z00.00 ENCOUNTER FOR GENERAL ADULT MEDICAL EXAMINATION WITHOUT ABNORMAL FINDINGS: ICD-10-CM

## 2019-05-29 PROCEDURE — G0463: CPT

## 2019-05-29 NOTE — COUNSELING
[Healthy eating counseling provided] : healthy eating [Weight management counseling provided] : Weight management [___ min/wk activity recommended] : [unfilled] min/wk activity recommended [Activity counseling provided] : activity [Walking] : Walking [Good understanding] : Patient has a good understanding of lifestyle changes and the steps needed to achieve self management goals [None] : None [ - Annual Depression Screening] : Annual Depression Screening

## 2019-05-29 NOTE — PHYSICAL EXAM
[No Acute Distress] : no acute distress [Well Developed] : well developed [Well Nourished] : well nourished [Well-Appearing] : well-appearing [PERRL] : pupils equal round and reactive to light [Normal Sclera/Conjunctiva] : normal sclera/conjunctiva [EOMI] : extraocular movements intact [Normal Oropharynx] : the oropharynx was normal [Normal Outer Ear/Nose] : the outer ears and nose were normal in appearance [No JVD] : no jugular venous distention [No Lymphadenopathy] : no lymphadenopathy [Supple] : supple [Thyroid Normal, No Nodules] : the thyroid was normal and there were no nodules present [No Respiratory Distress] : no respiratory distress  [Clear to Auscultation] : lungs were clear to auscultation bilaterally [Normal Rate] : normal rate  [No Accessory Muscle Use] : no accessory muscle use [No Murmur] : no murmur heard [Regular Rhythm] : with a regular rhythm [Normal S1, S2] : normal S1 and S2 [No Carotid Bruits] : no carotid bruits [No Abdominal Bruit] : a ~M bruit was not heard ~T in the abdomen [No Edema] : there was no peripheral edema [No Varicosities] : no varicosities [Pedal Pulses Present] : the pedal pulses are present [No Palpable Aorta] : no palpable aorta [Soft] : abdomen soft [No Extremity Clubbing/Cyanosis] : no extremity clubbing/cyanosis [Non-distended] : non-distended [Non Tender] : non-tender [No Masses] : no abdominal mass palpated [No HSM] : no HSM [Normal Anterior Cervical Nodes] : no anterior cervical lymphadenopathy [Normal Bowel Sounds] : normal bowel sounds [Normal Posterior Cervical Nodes] : no posterior cervical lymphadenopathy [No CVA Tenderness] : no CVA  tenderness [No Spinal Tenderness] : no spinal tenderness [No Rash] : no rash [No Joint Swelling] : no joint swelling [Grossly Normal Strength/Tone] : grossly normal strength/tone [Coordination Grossly Intact] : coordination grossly intact [Normal Gait] : normal gait [Deep Tendon Reflexes (DTR)] : deep tendon reflexes were 2+ and symmetric [No Focal Deficits] : no focal deficits [Memory Grossly Normal] : memory grossly normal [Speech Grossly Normal] : speech grossly normal [Normal Mood] : the mood was normal [Alert and Oriented x3] : oriented to person, place, and time [Normal Affect] : the affect was normal [de-identified] : obese [Normal Insight/Judgement] : insight and judgment were intact

## 2019-05-29 NOTE — ASSESSMENT
[FreeTextEntry1] : #Pre-employment physical\par - Physical Exam within normal limits\par - PPD was positive on 6/2/17, CXR on 4/29/19 is negative for acute disease\par - Work Form completed\par \par #Colon CA Screening\par - FIT tesk kit provided\par \par #Breast CA screening\par - Birads 2 Dense breasts (8/31/19)\par \par RTC for follow up end of August for Mammo

## 2019-05-29 NOTE — HISTORY OF PRESENT ILLNESS
[FreeTextEntry1] : [id number] [[employer] ] : provided by [employer]   [de-identified] : 50 YO F with PMHx of positive PPD presenting for pre-employment physical. Patient does not have any complaints. Denies fever, chills, night sweats, cough, unintended weight loss.  Reports negative PAP/HPV 1 year ago at another facility.  Denies blood in stool or urine.

## 2019-05-30 DIAGNOSIS — Z02.1 ENCOUNTER FOR PRE-EMPLOYMENT EXAMINATION: ICD-10-CM

## 2019-05-30 DIAGNOSIS — Z12.11 ENCOUNTER FOR SCREENING FOR MALIGNANT NEOPLASM OF COLON: ICD-10-CM

## 2019-05-30 DIAGNOSIS — R76.11 NONSPECIFIC REACTION TO TUBERCULIN SKIN TEST WITHOUT ACTIVE TUBERCULOSIS: ICD-10-CM

## 2019-06-19 ENCOUNTER — APPOINTMENT (OUTPATIENT)
Dept: GASTROENTEROLOGY | Facility: CLINIC | Age: 49
End: 2019-06-19
Payer: COMMERCIAL

## 2019-06-19 VITALS
OXYGEN SATURATION: 97 % | TEMPERATURE: 98.2 F | DIASTOLIC BLOOD PRESSURE: 62 MMHG | BODY MASS INDEX: 31.34 KG/M2 | HEART RATE: 63 BPM | WEIGHT: 166 LBS | SYSTOLIC BLOOD PRESSURE: 102 MMHG | HEIGHT: 61 IN

## 2019-06-19 PROCEDURE — 99204 OFFICE O/P NEW MOD 45 MIN: CPT

## 2019-06-19 NOTE — HISTORY OF PRESENT ILLNESS
[de-identified] : This Malaysian-speaking patient relays that she is been having heartburn and bloating. She strains during defecation. She has abdominal cramping. She denies rectal bleeding. She moves her bowels every few days with alternating constipation and diarrhea. She denies odynophagia, dysphagia or satiety. She does not exercise intentionally. She is under stress.

## 2019-06-19 NOTE — ASSESSMENT
[FreeTextEntry1] : Impression is that of a female who has abdominal bloating, heartburn and is due for screening colonoscopy.\par \par I have spent a great deal of time discussing the role of daily exercise with the patient. We discussed lifestyle modification and the merits of brief, exertional efforts. I have discussed nutrition in great detail including consuming vegetable fibers on a daily basis.  We have also reviewed the benefits of soluble fiber supplementation, including (but not limited to), favorable effects on lipid profile, weight control, decreasing the risk of cardiovascular disease and the salutary effects on colonic microbiota.\par \par Trial of a gas reducing peppermint oil extract.\par \par I have asked the patient to schedule both an upper endoscopy and a colonoscopy in the near future. I have reviewed the risks benefits and alternatives and provide the patient literature to read.  I have emphasized the need to have a good clean out including adequate fluid intake and avoiding seeds for one week prior to the procedure.

## 2019-06-19 NOTE — PHYSICAL EXAM
[General Appearance - Alert] : alert [General Appearance - In No Acute Distress] : in no acute distress [PERRL With Normal Accommodation] : pupils were equal in size, round, and reactive to light [Sclera] : the sclera and conjunctiva were normal [Outer Ear] : the ears and nose were normal in appearance [Extraocular Movements] : extraocular movements were intact [Neck Appearance] : the appearance of the neck was normal [Oropharynx] : the oropharynx was normal [Thyroid Diffuse Enlargement] : the thyroid was not enlarged [Neck Cervical Mass (___cm)] : no neck mass was observed [Jugular Venous Distention Increased] : there was no jugular-venous distention [Auscultation Breath Sounds / Voice Sounds] : lungs were clear to auscultation bilaterally [Thyroid Nodule] : there were no palpable thyroid nodules [Heart Sounds Gallop] : no gallops [Heart Sounds] : normal S1 and S2 [Heart Rate And Rhythm] : heart rate was normal and rhythm regular [Heart Sounds Pericardial Friction Rub] : no pericardial rub [Murmurs] : no murmurs [Edema] : there was no peripheral edema [Abdomen Tenderness] : non-tender [Abdomen Soft] : soft [Bowel Sounds] : normal bowel sounds [Abdomen Mass (___ Cm)] : no abdominal mass palpated [Cervical Lymph Nodes Enlarged Posterior Bilaterally] : posterior cervical [Cervical Lymph Nodes Enlarged Anterior Bilaterally] : anterior cervical [No CVA Tenderness] : no ~M costovertebral angle tenderness [Abnormal Walk] : normal gait [No Spinal Tenderness] : no spinal tenderness [Nail Clubbing] : no clubbing  or cyanosis of the fingernails [Musculoskeletal - Swelling] : no joint swelling seen [Skin Color & Pigmentation] : normal skin color and pigmentation [Motor Tone] : muscle strength and tone were normal [Skin Turgor] : normal skin turgor [] : no rash [No Focal Deficits] : no focal deficits [Oriented To Time, Place, And Person] : oriented to person, place, and time [Impaired Insight] : insight and judgment were intact [Affect] : the affect was normal

## 2019-08-07 ENCOUNTER — APPOINTMENT (OUTPATIENT)
Dept: FAMILY MEDICINE | Facility: HOSPITAL | Age: 49
End: 2019-08-07

## 2019-08-07 ENCOUNTER — OUTPATIENT (OUTPATIENT)
Dept: OUTPATIENT SERVICES | Facility: HOSPITAL | Age: 49
LOS: 1 days | End: 2019-08-07
Payer: COMMERCIAL

## 2019-08-07 VITALS
HEART RATE: 66 BPM | OXYGEN SATURATION: 99 % | BODY MASS INDEX: 30.99 KG/M2 | WEIGHT: 164 LBS | SYSTOLIC BLOOD PRESSURE: 101 MMHG | TEMPERATURE: 98 F | DIASTOLIC BLOOD PRESSURE: 67 MMHG | RESPIRATION RATE: 16 BRPM

## 2019-08-07 DIAGNOSIS — Z00.00 ENCOUNTER FOR GENERAL ADULT MEDICAL EXAMINATION WITHOUT ABNORMAL FINDINGS: ICD-10-CM

## 2019-08-07 DIAGNOSIS — Z92.89 PERSONAL HISTORY OF OTHER MEDICAL TREATMENT: ICD-10-CM

## 2019-08-07 DIAGNOSIS — K21.9 GASTRO-ESOPHAGEAL REFLUX DISEASE W/OUT ESOPHAGITIS: ICD-10-CM

## 2019-08-07 DIAGNOSIS — E78.5 HYPERLIPIDEMIA, UNSPECIFIED: ICD-10-CM

## 2019-08-07 DIAGNOSIS — Z02.1 ENCOUNTER FOR PRE-EMPLOYMENT EXAMINATION: ICD-10-CM

## 2019-08-07 DIAGNOSIS — L80 VITILIGO: ICD-10-CM

## 2019-08-07 PROCEDURE — G0463: CPT

## 2019-08-07 NOTE — HISTORY OF PRESENT ILLNESS
[de-identified] : 49 year old with history of positive PPD due to BCG vaccine here for pre-employment documentation. Patient states that she forgot the paperwork and will return with it tomorrow. Has chronic back pain x 1 year that is aggravated by activity and better with rest. Patient also follows PT for back pain which helps, and would like a referral. No additional complaints. Denies bowel or bladder dysfunction, saddle anesthesias, LE weakness.  [FreeTextEntry1] : f/u

## 2019-08-07 NOTE — ASSESSMENT
[FreeTextEntry1] : 49 year old with history of positive PPD due to BCG vaccine here for per-employment documentation and back pain.\par \par Back pain\par - continue PT\par - OTC NSAIDs\par \par HLD\par - counseled on diet and exercise modifications\par - Lipid profile\par - will recheck in 3 months\par \par Healthcare maintenance\par - scheduled for colonoscopy\par - will f/u in 3 weeks for beast exam and mammogram referral \par - per patient last pap done 1 year ago and normal. Advised to bring result next visit. \par \par F/u in 3 weeks\par D/w Dr. Puente\par \par

## 2019-08-07 NOTE — PHYSICAL EXAM
[No Acute Distress] : no acute distress [Well-Appearing] : well-appearing [Normal Sclera/Conjunctiva] : normal sclera/conjunctiva [Normal Outer Ear/Nose] : the outer ears and nose were normal in appearance [EOMI] : extraocular movements intact [Supple] : supple [Normal Oropharynx] : the oropharynx was normal [No Respiratory Distress] : no respiratory distress  [No Accessory Muscle Use] : no accessory muscle use [Clear to Auscultation] : lungs were clear to auscultation bilaterally [Normal Rate] : normal rate  [Normal S1, S2] : normal S1 and S2 [Regular Rhythm] : with a regular rhythm [No Abdominal Bruit] : a ~M bruit was not heard ~T in the abdomen [Soft] : abdomen soft [Non Tender] : non-tender [Grossly Normal Strength/Tone] : grossly normal strength/tone [No Spinal Tenderness] : no spinal tenderness [Coordination Grossly Intact] : coordination grossly intact [Normal Gait] : normal gait [de-identified] : b/l LE varicose veins

## 2019-08-08 ENCOUNTER — OUTPATIENT (OUTPATIENT)
Dept: OUTPATIENT SERVICES | Facility: HOSPITAL | Age: 49
LOS: 1 days | End: 2019-08-08
Payer: COMMERCIAL

## 2019-08-08 ENCOUNTER — APPOINTMENT (OUTPATIENT)
Dept: FAMILY MEDICINE | Facility: HOSPITAL | Age: 49
End: 2019-08-08

## 2019-08-08 VITALS
OXYGEN SATURATION: 99 % | DIASTOLIC BLOOD PRESSURE: 63 MMHG | BODY MASS INDEX: 30.99 KG/M2 | WEIGHT: 164 LBS | SYSTOLIC BLOOD PRESSURE: 92 MMHG | HEART RATE: 63 BPM | TEMPERATURE: 97.6 F | RESPIRATION RATE: 16 BRPM

## 2019-08-08 DIAGNOSIS — Z00.00 ENCOUNTER FOR GENERAL ADULT MEDICAL EXAMINATION WITHOUT ABNORMAL FINDINGS: ICD-10-CM

## 2019-08-08 DIAGNOSIS — Z02.1 ENCOUNTER FOR PRE-EMPLOYMENT EXAMINATION: ICD-10-CM

## 2019-08-08 DIAGNOSIS — E78.5 HYPERLIPIDEMIA, UNSPECIFIED: ICD-10-CM

## 2019-08-08 PROCEDURE — G0463: CPT

## 2019-08-08 PROCEDURE — 80061 LIPID PANEL: CPT

## 2019-08-08 PROCEDURE — 86706 HEP B SURFACE ANTIBODY: CPT

## 2019-08-08 NOTE — HISTORY OF PRESENT ILLNESS
[FreeTextEntry1] : F/u [de-identified] : 49 year old with history of positive PPD due to BCG vaccine here for pre-employment documentation. Will need hepatitis titers per documentation. No additional complaints.

## 2019-08-08 NOTE — ASSESSMENT
[FreeTextEntry1] : 49 year old with history of positive PPD due to BCG vaccine here for pre-employment documentation.\par \par Hepatitis titters\par - will f/u for results\par \par D/w Dr. Ferguson\par \par \par

## 2019-08-08 NOTE — PHYSICAL EXAM
[No Acute Distress] : no acute distress [Well-Appearing] : well-appearing [EOMI] : extraocular movements intact [Normal Sclera/Conjunctiva] : normal sclera/conjunctiva [Normal Outer Ear/Nose] : the outer ears and nose were normal in appearance [Normal Oropharynx] : the oropharynx was normal [Supple] : supple [No Accessory Muscle Use] : no accessory muscle use [No Respiratory Distress] : no respiratory distress  [Normal Rate] : normal rate  [Normal S1, S2] : normal S1 and S2 [Coordination Grossly Intact] : coordination grossly intact [Normal Gait] : normal gait [Speech Grossly Normal] : speech grossly normal [Normal Mood] : the mood was normal

## 2019-08-09 DIAGNOSIS — Z11.9 ENCOUNTER FOR SCREENING FOR INFECTIOUS AND PARASITIC DISEASES, UNSPECIFIED: ICD-10-CM

## 2019-08-14 ENCOUNTER — OTHER (OUTPATIENT)
Age: 49
End: 2019-08-14

## 2019-08-14 LAB
CHOLEST SERPL-MCNC: 282 MG/DL
CHOLEST/HDLC SERPL: 3.8 RATIO
HBV SURFACE AB SER QL: NONREACTIVE
HDLC SERPL-MCNC: 74 MG/DL
LDLC SERPL CALC-MCNC: 183 MG/DL
TRIGL SERPL-MCNC: 125 MG/DL

## 2019-08-17 ENCOUNTER — APPOINTMENT (OUTPATIENT)
Dept: FAMILY MEDICINE | Facility: HOSPITAL | Age: 49
End: 2019-08-17

## 2019-08-17 ENCOUNTER — OUTPATIENT (OUTPATIENT)
Dept: OUTPATIENT SERVICES | Facility: HOSPITAL | Age: 49
LOS: 1 days | End: 2019-08-17
Payer: COMMERCIAL

## 2019-08-17 VITALS
RESPIRATION RATE: 16 BRPM | OXYGEN SATURATION: 98 % | SYSTOLIC BLOOD PRESSURE: 96 MMHG | DIASTOLIC BLOOD PRESSURE: 52 MMHG | HEART RATE: 70 BPM | TEMPERATURE: 97.8 F | WEIGHT: 136 LBS | BODY MASS INDEX: 25.7 KG/M2

## 2019-08-17 DIAGNOSIS — Z00.00 ENCOUNTER FOR GENERAL ADULT MEDICAL EXAMINATION WITHOUT ABNORMAL FINDINGS: ICD-10-CM

## 2019-08-17 PROCEDURE — G0463: CPT

## 2019-08-17 PROCEDURE — 90746 HEPB VACCINE 3 DOSE ADULT IM: CPT

## 2019-08-17 NOTE — HISTORY OF PRESENT ILLNESS
[FreeTextEntry1] : F/u [de-identified] : 49 year old with history of positive PPD due to BCG vaccine here for hepatitis B vaccine for pre-employment documentation. No additional complaints.

## 2019-08-17 NOTE — ASSESSMENT
[FreeTextEntry1] : 49 year old with history of positive PPD due to BCG vaccine here for hepatitis B vaccine for pre-employment documentation. \par \par Need for Vaccine\par - Hepatitis B vaccine given\par - Return in 1-2 months for 2nd dose and February for 3rd dose\par \par Healthcare Maintenance\par - mammogram referral given\par - per patient last pap done 1 year ago and normal. Advised to bring result next visit. \par \par F/u in 1 month\par D/w Dr. Ferguson\par

## 2019-08-17 NOTE — PHYSICAL EXAM
[No Acute Distress] : no acute distress [Well-Appearing] : well-appearing [Normal Sclera/Conjunctiva] : normal sclera/conjunctiva [EOMI] : extraocular movements intact [Normal Outer Ear/Nose] : the outer ears and nose were normal in appearance [Normal Oropharynx] : the oropharynx was normal [Supple] : supple [No Respiratory Distress] : no respiratory distress  [No Accessory Muscle Use] : no accessory muscle use [Normal Rate] : normal rate  [Normal S1, S2] : normal S1 and S2 [Coordination Grossly Intact] : coordination grossly intact [Normal Gait] : normal gait [Speech Grossly Normal] : speech grossly normal [Normal Mood] : the mood was normal

## 2019-08-19 DIAGNOSIS — Z23 ENCOUNTER FOR IMMUNIZATION: ICD-10-CM

## 2019-08-19 DIAGNOSIS — Z12.39 ENCOUNTER FOR OTHER SCREENING FOR MALIGNANT NEOPLASM OF BREAST: ICD-10-CM

## 2019-08-23 ENCOUNTER — APPOINTMENT (OUTPATIENT)
Dept: FAMILY MEDICINE | Facility: HOSPITAL | Age: 49
End: 2019-08-23

## 2019-08-26 ENCOUNTER — APPOINTMENT (OUTPATIENT)
Dept: FAMILY MEDICINE | Facility: HOSPITAL | Age: 49
End: 2019-08-26

## 2019-08-26 ENCOUNTER — OUTPATIENT (OUTPATIENT)
Dept: OUTPATIENT SERVICES | Facility: HOSPITAL | Age: 49
LOS: 1 days | End: 2019-08-26
Payer: COMMERCIAL

## 2019-08-26 VITALS
DIASTOLIC BLOOD PRESSURE: 65 MMHG | TEMPERATURE: 97.6 F | BODY MASS INDEX: 30.8 KG/M2 | OXYGEN SATURATION: 96 % | HEART RATE: 66 BPM | RESPIRATION RATE: 16 BRPM | WEIGHT: 163 LBS | SYSTOLIC BLOOD PRESSURE: 93 MMHG

## 2019-08-26 DIAGNOSIS — Z00.00 ENCOUNTER FOR GENERAL ADULT MEDICAL EXAMINATION WITHOUT ABNORMAL FINDINGS: ICD-10-CM

## 2019-08-26 PROCEDURE — G0463: CPT

## 2019-08-26 PROCEDURE — 80307 DRUG TEST PRSMV CHEM ANLYZR: CPT

## 2019-08-26 NOTE — PHYSICAL EXAM
[No Acute Distress] : no acute distress [Well-Appearing] : well-appearing [Normal Sclera/Conjunctiva] : normal sclera/conjunctiva [EOMI] : extraocular movements intact [No Respiratory Distress] : no respiratory distress  [No Accessory Muscle Use] : no accessory muscle use [Normal Rate] : normal rate  [Regular Rhythm] : with a regular rhythm [Normal Gait] : normal gait [Speech Grossly Normal] : speech grossly normal [Alert and Oriented x3] : oriented to person, place, and time [Normal Mood] : the mood was normal

## 2019-08-26 NOTE — HISTORY OF PRESENT ILLNESS
[FreeTextEntry1] : F/u [de-identified] : 49 year old with history of positive PPD here for urine drug screen for job. No additional complaints.

## 2019-08-26 NOTE — ASSESSMENT
[FreeTextEntry1] : 49 year old with history of positive PPD due to BCG vaccine here for urine drug screen for job.\par \par Urine drug screening\par - Urine drug screen ordered\par \par F/u in 1 month\par D/w Dr. Puente

## 2019-08-27 DIAGNOSIS — Z02.83 ENCOUNTER FOR BLOOD-ALCOHOL AND BLOOD-DRUG TEST: ICD-10-CM

## 2019-09-06 ENCOUNTER — EMERGENCY (EMERGENCY)
Facility: HOSPITAL | Age: 49
LOS: 1 days | Discharge: HOME | End: 2019-09-06
Attending: EMERGENCY MEDICINE | Admitting: EMERGENCY MEDICINE
Payer: COMMERCIAL

## 2019-09-06 VITALS
DIASTOLIC BLOOD PRESSURE: 57 MMHG | TEMPERATURE: 98 F | RESPIRATION RATE: 16 BRPM | SYSTOLIC BLOOD PRESSURE: 109 MMHG | OXYGEN SATURATION: 97 % | HEART RATE: 71 BPM

## 2019-09-06 VITALS
WEIGHT: 112.44 LBS | RESPIRATION RATE: 18 BRPM | DIASTOLIC BLOOD PRESSURE: 98 MMHG | HEIGHT: 63.78 IN | SYSTOLIC BLOOD PRESSURE: 169 MMHG | HEART RATE: 78 BPM | TEMPERATURE: 99 F

## 2019-09-06 LAB
ALBUMIN SERPL ELPH-MCNC: 4.5 G/DL — SIGNIFICANT CHANGE UP (ref 3.3–5)
ALP SERPL-CCNC: 86 U/L — SIGNIFICANT CHANGE UP (ref 40–120)
ALT FLD-CCNC: 45 U/L — SIGNIFICANT CHANGE UP (ref 10–45)
ANION GAP SERPL CALC-SCNC: 9 MMOL/L — SIGNIFICANT CHANGE UP (ref 5–17)
APTT BLD: 35.4 SEC — SIGNIFICANT CHANGE UP (ref 27.5–36.3)
AST SERPL-CCNC: 29 U/L — SIGNIFICANT CHANGE UP (ref 10–40)
BASOPHILS # BLD AUTO: 0.04 K/UL — SIGNIFICANT CHANGE UP (ref 0–0.2)
BASOPHILS NFR BLD AUTO: 0.5 % — SIGNIFICANT CHANGE UP (ref 0–2)
BILIRUB SERPL-MCNC: 0.4 MG/DL — SIGNIFICANT CHANGE UP (ref 0.2–1.2)
BUN SERPL-MCNC: 11 MG/DL — SIGNIFICANT CHANGE UP (ref 7–23)
CALCIUM SERPL-MCNC: 9.6 MG/DL — SIGNIFICANT CHANGE UP (ref 8.4–10.5)
CHLORIDE SERPL-SCNC: 102 MMOL/L — SIGNIFICANT CHANGE UP (ref 96–108)
CO2 SERPL-SCNC: 28 MMOL/L — SIGNIFICANT CHANGE UP (ref 22–31)
CREAT SERPL-MCNC: 0.91 MG/DL — SIGNIFICANT CHANGE UP (ref 0.5–1.3)
EOSINOPHIL # BLD AUTO: 0.24 K/UL — SIGNIFICANT CHANGE UP (ref 0–0.5)
EOSINOPHIL NFR BLD AUTO: 3.3 % — SIGNIFICANT CHANGE UP (ref 0–6)
GLUCOSE BLDC GLUCOMTR-MCNC: 157 MG/DL — HIGH (ref 70–99)
GLUCOSE SERPL-MCNC: 141 MG/DL — HIGH (ref 70–99)
HCT VFR BLD CALC: 44.2 % — SIGNIFICANT CHANGE UP (ref 34.5–45)
HGB BLD-MCNC: 15.5 G/DL — SIGNIFICANT CHANGE UP (ref 11.5–15.5)
IMM GRANULOCYTES NFR BLD AUTO: 0.3 % — SIGNIFICANT CHANGE UP (ref 0–1.5)
INR BLD: 1.03 RATIO — SIGNIFICANT CHANGE UP (ref 0.88–1.16)
LYMPHOCYTES # BLD AUTO: 2.04 K/UL — SIGNIFICANT CHANGE UP (ref 1–3.3)
LYMPHOCYTES # BLD AUTO: 27.7 % — SIGNIFICANT CHANGE UP (ref 13–44)
MCHC RBC-ENTMCNC: 32.2 PG — SIGNIFICANT CHANGE UP (ref 27–34)
MCHC RBC-ENTMCNC: 35.1 GM/DL — SIGNIFICANT CHANGE UP (ref 32–36)
MCV RBC AUTO: 91.9 FL — SIGNIFICANT CHANGE UP (ref 80–100)
MONOCYTES # BLD AUTO: 0.51 K/UL — SIGNIFICANT CHANGE UP (ref 0–0.9)
MONOCYTES NFR BLD AUTO: 6.9 % — SIGNIFICANT CHANGE UP (ref 2–14)
NEUTROPHILS # BLD AUTO: 4.51 K/UL — SIGNIFICANT CHANGE UP (ref 1.8–7.4)
NEUTROPHILS NFR BLD AUTO: 61.3 % — SIGNIFICANT CHANGE UP (ref 43–77)
NRBC # BLD: 0 /100 WBCS — SIGNIFICANT CHANGE UP (ref 0–0)
PLATELET # BLD AUTO: 270 K/UL — SIGNIFICANT CHANGE UP (ref 150–400)
POTASSIUM SERPL-MCNC: 3.2 MMOL/L — LOW (ref 3.5–5.3)
POTASSIUM SERPL-SCNC: 3.2 MMOL/L — LOW (ref 3.5–5.3)
PROT SERPL-MCNC: 8.4 G/DL — HIGH (ref 6–8.3)
PROTHROM AB SERPL-ACNC: 11.5 SEC — SIGNIFICANT CHANGE UP (ref 10–12.9)
RBC # BLD: 4.81 M/UL — SIGNIFICANT CHANGE UP (ref 3.8–5.2)
RBC # FLD: 11.7 % — SIGNIFICANT CHANGE UP (ref 10.3–14.5)
SODIUM SERPL-SCNC: 139 MMOL/L — SIGNIFICANT CHANGE UP (ref 135–145)
TROPONIN I SERPL-MCNC: <.017 NG/ML — LOW (ref 0.02–0.06)
WBC # BLD: 7.36 K/UL — SIGNIFICANT CHANGE UP (ref 3.8–10.5)
WBC # FLD AUTO: 7.36 K/UL — SIGNIFICANT CHANGE UP (ref 3.8–10.5)

## 2019-09-06 PROCEDURE — 80053 COMPREHEN METABOLIC PANEL: CPT

## 2019-09-06 PROCEDURE — 93010 ELECTROCARDIOGRAM REPORT: CPT

## 2019-09-06 PROCEDURE — 85610 PROTHROMBIN TIME: CPT

## 2019-09-06 PROCEDURE — 85027 COMPLETE CBC AUTOMATED: CPT

## 2019-09-06 PROCEDURE — 99284 EMERGENCY DEPT VISIT MOD MDM: CPT | Mod: 25

## 2019-09-06 PROCEDURE — 70450 CT HEAD/BRAIN W/O DYE: CPT

## 2019-09-06 PROCEDURE — 96374 THER/PROPH/DIAG INJ IV PUSH: CPT

## 2019-09-06 PROCEDURE — 99285 EMERGENCY DEPT VISIT HI MDM: CPT

## 2019-09-06 PROCEDURE — 71046 X-RAY EXAM CHEST 2 VIEWS: CPT | Mod: 26

## 2019-09-06 PROCEDURE — 71046 X-RAY EXAM CHEST 2 VIEWS: CPT

## 2019-09-06 PROCEDURE — 84484 ASSAY OF TROPONIN QUANT: CPT

## 2019-09-06 PROCEDURE — 93005 ELECTROCARDIOGRAM TRACING: CPT

## 2019-09-06 PROCEDURE — 36415 COLL VENOUS BLD VENIPUNCTURE: CPT

## 2019-09-06 PROCEDURE — 85730 THROMBOPLASTIN TIME PARTIAL: CPT

## 2019-09-06 PROCEDURE — 70450 CT HEAD/BRAIN W/O DYE: CPT | Mod: 26

## 2019-09-06 PROCEDURE — 82962 GLUCOSE BLOOD TEST: CPT

## 2019-09-06 RX ORDER — POTASSIUM CHLORIDE 20 MEQ
40 PACKET (EA) ORAL ONCE
Refills: 0 | Status: COMPLETED | OUTPATIENT
Start: 2019-09-06 | End: 2019-09-06

## 2019-09-06 RX ADMIN — Medication 1 MILLIGRAM(S): at 19:03

## 2019-09-06 RX ADMIN — Medication 40 MILLIEQUIVALENT(S): at 20:16

## 2019-09-06 NOTE — ED ADULT NURSE NOTE - NSIMPLEMENTINTERV_GEN_ALL_ED
Implemented All Universal Safety Interventions:  [place] to call system. Call bell, personal items and telephone within reach. Instruct patient to call for assistance. Room bathroom lighting operational. Non-slip footwear when patient is off stretcher. Physically safe environment: no spills, clutter or unnecessary equipment. Stretcher in lowest position, wheels locked, appropriate side rails in place.

## 2019-09-06 NOTE — ED PROVIDER NOTE - PROGRESS NOTE DETAILS
There was delay in calling code stroke due to unknown time of onset at triage, pt was anxious. Pt then expressed after being in ed for 30 mins, time of onset was 1.5 hours prior to arrival after calming down. CT showing no findings. TELE STROKE - Dr. Saeed reports no intervention needed at this time, pt not candidate for TPA.

## 2019-09-06 NOTE — ED PROVIDER NOTE - PATIENT PORTAL LINK FT
You can access the FollowMyHealth Patient Portal offered by [facility] by registering at the following website: [email or web address]. By joining [facility] [facility]’s FollowMyHealth portal, you will also be able to view your health information using other applications (apps) compatible with our system.

## 2019-09-06 NOTE — ED PROVIDER NOTE - ATTENDING CONTRIBUTION TO CARE
Lori with ANTONIO Ackerman. 49 yr old female with hx of anxiety presents c/o left arm/leg numbness. Pt reports she was working today from 6- 2pm, and was asked to stay until 330pm, pt left work, was home laying down and started to feel lower back/hip pain, and then dizziness, and then started to develop left arm/ leg numbness. Pt denies any weakness, fever, chills, chest pain, sob or any other symptoms.  Pt is crying uncontrollably and hyperventilating. She describes that when she got home, she was crying a lot because she was uncomfortable. Clinical gestalt : pt is anxious and having paresthesias due to hyperventilation syndrome. Howevr, one sided?? Therefore, stroke code called- no intervention as per stroke att .  ct and labs- reviewed   pt given ativan, calm and feeling better   pt stable for discharge and to follow up with pmd.  I performed a face to face bedside interview with patient regarding history of present illness, review of symptoms and past medical history. I completed an independent physical exam.  I have discussed the patient's plan of care with Physician Assistant (PA). I agree with note as stated above, having amended the EMR as needed to reflect my findings.   This includes History of Present Illness, HIV, Past Medical/Surgical/Family/Social History, Allergies and Home Medications, Review of Systems, Physical Exam, and any Progress Notes during the time I functioned as the attending physician for this patient.

## 2019-09-06 NOTE — ED PROVIDER NOTE - NSFOLLOWUPINSTRUCTIONS_ED_ALL_ED_FT
Paresthesia    WHAT YOU NEED TO KNOW:    What is paresthesia? Paresthesia is numbness, tingling, or burning. It can happen in any part of your body, but usually occurs in your legs, feet, arms, or hands.    What causes paresthesia? A large number of conditions can cause paresthesia. Nerves that provide sensation are affected. Paresthesia happens because of changes in these nerves, or in nerve pathways. The changes can be temporary, such as if you take certain medicines or you are not getting enough vitamin B. Nerve damage can lead to permanent paresthesia. Conditions that may cause nerve damage include diabetes, carpel tunnel syndrome, stroke, and multiple sclerosis. The exact cause of your paresthesia may not be known.    What should I tell my healthcare provider about what I feel? You can help your healthcare provider by describing anything you feel, such as the following:     No feeling in the affected area      A feeling of pins and needles      An electric shock feeling      Heaviness      Trouble moving the affected area      A feeling that something is crawling under your skin      A feeling of burning or of cold in the affected area    How is paresthesia diagnosed? Your healthcare provider will examine you and ask about your symptoms. Tell your provider when the symptoms began. Include anything that makes your symptoms worse or better. Your provider will also need to know if you have a disease or condition that could be causing your symptoms. Tell him or her about the medicines you take. Include the amounts you take and when you take each medicine. You may also need any of the following:     Blood tests may show low levels of vitamin B or a high blood sugar level.      X-ray, MRI, or CT scan pictures may show damage to the area where you have paresthesia. You may be given contrast liquid to help the area show up better in the pictures. Tell the healthcare provider if you have ever had an allergic reaction to contrast liquid. Do not enter the MRI room with anything metal. Metal can cause severe injury. Tell the healthcare provider if you have any metal in or on your body.      Nerve conduction studies may be done to test your nerve function.    How is paresthesia treated? Treatment will depend on what is causing your paresthesia. You may need to increase the amount of vitamin B in your blood. Your healthcare provider may change or stop a medicine you are taking that is causing your symptoms. Permanent paresthesia may be helped with nerve medicine. If you have diabetes, your healthcare provider or diabetes specialist can help you control your blood sugar levels. Your provider may recommend a splint or surgery if you have paresthesia caused by carpal tunnel syndrome.    What can I do to manage paresthesia?     Protect the area from injury. You may injure or burn yourself if you lose feeling in the area. Be careful when you touch anything that could be hot. Wear sturdy shoes to protect your feet. Ask about other ways to protect yourself.       Go to physical or occupational therapy if directed. Your provider may recommend therapy if you have a condition such as carpal tunnel syndrome. A physical therapist can teach you exercises to help strengthen the area or increase your ability to move it. An occupational therapist can help you find new ways to do your daily activities.      Manage health conditions that can cause paresthesia. Work with your diabetes specialist if you have uncontrolled diabetes. A dietitian or your healthcare provider can help you create a meal plan if you have low vitamin B levels. Your provider can help you manage your health if you have multiple sclerosis or you had a stroke. It is important to manage health conditions to stop paresthesia or prevent it from getting worse.    When should I seek immediate care?     You have severe pain along with numbness and tingling.      Your legs suddenly become cold. You have trouble moving your legs, and they ache.      You have increased weakness in a part of your body.      You have uncontrolled movements.    When should I contact my healthcare provider?     Your symptoms do not improve.      You have symptoms in more than one part of your body.      You have questions or concerns about your condition or care.    CARE AGREEMENT:    You have the right to help plan your care. Learn about your health condition and how it may be treated. Discuss treatment options with your healthcare providers to decide what care you want to receive. You always have the right to refuse treatment.      Anxiety    WHAT YOU NEED TO KNOW:    Anxiety is a condition that causes you to feel extremely worried or nervous. The feelings are so strong that they can cause problems with your daily activities or sleep. Anxiety may be triggered by something you fear, or it may happen without a cause. Family or work stress, smoking, caffeine, and alcohol can increase your risk for anxiety. Certain medicines or health conditions can also increase your risk. Anxiety can become a long-term condition if it is not managed or treated.     DISCHARGE INSTRUCTIONS:    Call 911 if:     You have chest pain, tightness, or heaviness that may spread to your shoulders, arms, jaw, neck, or back.      You feel like hurting yourself or someone else.     Contact your healthcare provider if:     Your symptoms get worse or do not get better with treatment.      Your anxiety keeps you from doing your regular daily activities.      You have new symptoms since your last visit.      You have questions or concerns about your condition or care.    Medicines:     Medicines may be given to help you feel more calm and relaxed, and decrease your symptoms.      Take your medicine as directed. Contact your healthcare provider if you think your medicine is not helping or if you have side effects. Tell him of her if you are allergic to any medicine. Keep a list of the medicines, vitamins, and herbs you take. Include the amounts, and when and why you take them. Bring the list or the pill bottles to follow-up visits. Carry your medicine list with you in case of an emergency.    Follow up with your healthcare provider within 2 weeks or as directed: Write down your questions so you remember to ask them during your visits.    Manage anxiety:     Talk to someone about your anxiety. Your healthcare provider may suggest counseling. Cognitive behavioral therapy can help you understand and change how you react to events that trigger your symptoms. You might feel more comfortable talking with a friend or family member about your anxiety. Choose someone you know will be supportive and encouraging.      Find ways to relax. Activities such as exercise, meditation, or listening to music can help you relax. Spend time with friends, or do things you enjoy.      Practice deep breathing. Deep breathing can help you relax when you feel anxious. Focus on taking slow, deep breaths several times a day, or during an anxiety attack. Breathe in through your nose and out through your mouth.       Create a regular sleep routine. Regular sleep can help you feel calmer during the day. Go to sleep and wake up at the same times every day. Do not watch television or use the computer right before bed. Your room should be comfortable, dark, and quiet.       Eat a variety of healthy foods. Healthy foods include fruits, vegetables, low-fat dairy products, lean meats, fish, whole-grain breads, and cooked beans. Healthy foods can help you feel less anxious and have more energy.      Exercise regularly. Exercise can increase your energy level. Exercise may also lift your mood and help you sleep better. Your healthcare provider can help you create an exercise plan.      Do not smoke. Nicotine and other chemicals in cigarettes and cigars can increase anxiety. Ask your healthcare provider for information if you currently smoke and need help to quit. E-cigarettes or smokeless tobacco still contain nicotine. Talk to your healthcare provider before you use these products.       Do not have caffeine. Caffeine can make your symptoms worse. Do not have foods or drinks that are meant to increase your energy level.      Limit or do not drink alcohol. Ask your healthcare provider if alcohol is safe for you. You may not be able to drink alcohol if you take certain anxiety or depression medicines. Limit alcohol to 1 drink per day if you are a woman. Limit alcohol to 2 drinks per day if you are a man. A drink of alcohol is 12 ounces of beer, 5 ounces of wine, or 1½ ounces of liquor.       Do not use drugs. Drugs can make your anxiety worse. It can also make anxiety hard to manage. Talk to your healthcare provider if you use drugs and want help to quit.

## 2019-09-06 NOTE — ED ADULT NURSE REASSESSMENT NOTE - NS ED NURSE REASSESS COMMENT FT1
1935 Pt is alert, denies any chest pain or SOB, states that she is feeling better and calmer with the pill that was given to her. NIH Scale done.

## 2019-09-06 NOTE — ED ADULT NURSE NOTE - OBJECTIVE STATEMENT
Pt arrives to ER crying hysterically stating that she has numbness to her left side. Pt states that she began having hip and back pain after work and upon arriving home, began hysterically crying and then felt like she was going to pass out. Pt then reports having a numbness/weakness sensation to left arm and left leg, reports mild chest pain, denies abd pain n/v/d, denies fever/chills. Pt presents with equal strength to bilateral upper and lower extremities, neg facial droop noted.  Pt arrives to ER crying hysterically stating that she has numbness to her left side. Pt states that she began having hip and back pain after work and upon arriving home, began hysterically crying and then felt like she was going to pass out. Pt then reports having a numbness/weakness sensation to left arm and left leg, reports mild chest pain, denies abd pain n/v/d, denies fever/chills. Pt presents with equal strength to bilateral upper and lower extremities, neg facial droop noted. . Pt reports history of anxiety and panic attacks

## 2019-09-06 NOTE — ED PROVIDER NOTE - CARE PROVIDER_API CALL
Chad Florian (MD)  Neurology  [address], [address]  [place], [place] [id number]  Phone: [phone]  Fax: [phone]  Follow Up Time:

## 2019-09-06 NOTE — ED ADULT NURSE REASSESSMENT NOTE - NS ED NURSE REASSESS COMMENT FT1
ANTONIO Cain called into triage to evaluate pt for possible code stroke symptoms, pt able to move all extremities and no code stroke activated at this time.

## 2019-09-06 NOTE — ED PROVIDER NOTE - CLINICAL SUMMARY MEDICAL DECISION MAKING FREE TEXT BOX
49 yr old female with hx of anxiety presents c/o left arm/leg numbness. Pt reports she was working today from 6- 2pm, and was asked to stay until 330pm, pt left work, was home laying down and started to feel lower back/hip pain, and then dizziness, and then started to develop left arm/ leg numbness. Pt denies any weakness, fever, chills, chest pain, sob or any other symptoms. 49 yr old female with hx of anxiety presents c/o left arm/leg numbness. Pt reports she was working today from 6- 2pm, and was asked to stay until 330pm, pt left work, was home laying down and started to feel lower back/hip pain, and then dizziness, and then started to develop left arm/ leg numbness. Pt denies any weakness, fever, chills, chest pain, sob or any other symptoms.    stroke code called- no intervention as per stroke att   ct and labs- reviewed   pt given ativan, calm and feeling better   pt stable for discharge and to follow up with pmd.

## 2019-09-09 ENCOUNTER — APPOINTMENT (OUTPATIENT)
Dept: FAMILY MEDICINE | Facility: HOSPITAL | Age: 49
End: 2019-09-09

## 2019-09-09 ENCOUNTER — OUTPATIENT (OUTPATIENT)
Dept: OUTPATIENT SERVICES | Facility: HOSPITAL | Age: 49
LOS: 1 days | End: 2019-09-09
Payer: COMMERCIAL

## 2019-09-09 VITALS
RESPIRATION RATE: 16 BRPM | DIASTOLIC BLOOD PRESSURE: 64 MMHG | TEMPERATURE: 98 F | SYSTOLIC BLOOD PRESSURE: 98 MMHG | HEART RATE: 63 BPM | OXYGEN SATURATION: 98 % | BODY MASS INDEX: 30.8 KG/M2 | WEIGHT: 163 LBS

## 2019-09-09 DIAGNOSIS — Z29.9 ENCOUNTER FOR PROPHYLACTIC MEASURES, UNSPECIFIED: ICD-10-CM

## 2019-09-09 DIAGNOSIS — Z23 ENCOUNTER FOR IMMUNIZATION: ICD-10-CM

## 2019-09-09 DIAGNOSIS — Z02.83 ENCOUNTER FOR BLOOD-ALCOHOL AND BLOOD-DRUG TEST: ICD-10-CM

## 2019-09-09 DIAGNOSIS — Z00.00 ENCOUNTER FOR GENERAL ADULT MEDICAL EXAMINATION WITHOUT ABNORMAL FINDINGS: ICD-10-CM

## 2019-09-09 PROBLEM — F41.9 ANXIETY DISORDER, UNSPECIFIED: Chronic | Status: ACTIVE | Noted: 2019-09-06

## 2019-09-09 LAB
AMPHET UR-MCNC: NEGATIVE
BARBITURATES UR-MCNC: NEGATIVE
BENZODIAZ UR-MCNC: NEGATIVE
COCAINE METAB.OTHER UR-MCNC: NEGATIVE
CREATININE, URINE: 107.7 MG/DL
METHADONE UR-MCNC: NEGATIVE
METHAQUALONE UR-MCNC: NEGATIVE
OPIATES UR-MCNC: NEGATIVE
PCP UR-MCNC: NEGATIVE
PROPOXYPH UR QL: NEGATIVE
THC UR QL: NEGATIVE

## 2019-09-09 PROCEDURE — G0463: CPT

## 2019-09-10 NOTE — PLAN
[FreeTextEntry1] : Patient encouraged to find purpose/goal in work, and strive for what would make her happy, which she admits would be to get a job that makes her feel more purposeful.\par \par - s/w referral for therapy\par \par Case dw dr. Puente

## 2019-09-10 NOTE — PHYSICAL EXAM
[Well Developed] : well developed [Well-Appearing] : well-appearing [Memory Grossly Normal] : memory grossly normal [Speech Grossly Normal] : speech grossly normal [Normal Insight/Judgement] : insight and judgment were intact [de-identified] : tearful+ [de-identified] : depressed mood and affect, tearful+

## 2019-09-10 NOTE — REVIEW OF SYSTEMS
[Depression] : depression [Headache] : no headache [Memory Loss] : no memory loss [Suicidal] : not suicidal [Insomnia] : no insomnia [Anxiety] : no anxiety

## 2019-09-10 NOTE — HISTORY OF PRESENT ILLNESS
[FreeTextEntry8] : 49F presents acutely after being seen in the ED for numbness of the RUE and RLE, since resolved. Patient worked up with imaging and labs in the ED, note and work up reviewed and appreciated. Patient has had these symptoms in the past when feeling depressed. Patient seen and examined today, noticed to be tearful, and feeling depressed. Patient admits that she has had episodes of depression like this over the past few years. 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 Patient taking classes for english but is frustrated that it is hard to learn.\par \par Patient denies sleep abnormalities, guilt, appetite changes, suicidal ideations. Admits feeling less able to focus/concentrate, feeling preoccupied, less social or interested in hobbies, and decreased energy.

## 2019-09-11 ENCOUNTER — APPOINTMENT (OUTPATIENT)
Dept: FAMILY MEDICINE | Facility: HOSPITAL | Age: 49
End: 2019-09-11

## 2019-09-11 DIAGNOSIS — F41.9 ANXIETY DISORDER, UNSPECIFIED: ICD-10-CM

## 2019-11-16 ENCOUNTER — OUTPATIENT (OUTPATIENT)
Dept: OUTPATIENT SERVICES | Facility: HOSPITAL | Age: 49
LOS: 1 days | End: 2019-11-16
Payer: COMMERCIAL

## 2019-11-16 ENCOUNTER — APPOINTMENT (OUTPATIENT)
Dept: FAMILY MEDICINE | Facility: HOSPITAL | Age: 49
End: 2019-11-16

## 2019-11-16 VITALS
WEIGHT: 157 LBS | SYSTOLIC BLOOD PRESSURE: 96 MMHG | TEMPERATURE: 97.5 F | HEART RATE: 54 BPM | BODY MASS INDEX: 29.67 KG/M2 | RESPIRATION RATE: 16 BRPM | DIASTOLIC BLOOD PRESSURE: 70 MMHG | OXYGEN SATURATION: 98 %

## 2019-11-16 DIAGNOSIS — Z00.00 ENCOUNTER FOR GENERAL ADULT MEDICAL EXAMINATION WITHOUT ABNORMAL FINDINGS: ICD-10-CM

## 2019-11-16 DIAGNOSIS — Z11.59 ENCOUNTER FOR SCREENING FOR OTHER VIRAL DISEASES: ICD-10-CM

## 2019-11-16 DIAGNOSIS — Z87.39 PERSONAL HISTORY OF OTHER DISEASES OF THE MUSCULOSKELETAL SYSTEM AND CONNECTIVE TISSUE: ICD-10-CM

## 2019-11-16 PROCEDURE — G0463: CPT

## 2019-11-16 PROCEDURE — 90746 HEPB VACCINE 3 DOSE ADULT IM: CPT

## 2019-11-16 RX ORDER — SODIUM SULFATE, POTASSIUM SULFATE, MAGNESIUM SULFATE 17.5; 3.13; 1.6 G/ML; G/ML; G/ML
17.5-3.13-1.6 SOLUTION, CONCENTRATE ORAL
Qty: 1 | Refills: 0 | Status: DISCONTINUED | COMMUNITY
Start: 2019-06-19 | End: 2019-11-16

## 2019-11-16 NOTE — ASSESSMENT
[FreeTextEntry1] : #Health maintenance\par - Pt received flu vaccine 2 weeks ago at [facility]\par - Needs Pap/HPV - can perform on next clinic visit\par - FIT test kit and Mammogram script given today\par

## 2019-11-16 NOTE — HISTORY OF PRESENT ILLNESS
[[employer] ] : provided by [employer]   [FreeTextEntry1] : [id number] [de-identified] : 50 yo female presenting for follow up for depression history. Pt also needs refill on sertraline medication. Pt today reports that she is overall doing well, denies any feeling of deep sadness in the past few weeks, denies feeling of hopelessness or loss of interest in things she once liked. She also denies insomnia. Pt states having good support at home from his family, especially her son, as well as from his [employer] group. She continues to take sertraline as previously prescribed and reports that the medication has also made her feel better. No adverse effects of medication other than occasional abdominal discomfort. Pt also with h/o GERD. [TWNoteComboBox1] : Indonesian

## 2019-11-16 NOTE — REVIEW OF SYSTEMS
[Depression] : depression [Abdominal Pain] : no abdominal pain [Nausea] : no nausea [Vomiting] : no vomiting [Diarrhea] : diarrhea [Headache] : no headache [Dizziness] : no dizziness [Suicidal] : not suicidal [de-identified] : h/o depression, pt reports better mood on medication [Insomnia] : no insomnia [Anxiety] : no anxiety

## 2019-11-16 NOTE — PHYSICAL EXAM
[Well Developed] : well developed [Well Nourished] : well nourished [No Respiratory Distress] : no respiratory distress  [No Acute Distress] : no acute distress [Normal Rate] : normal rate  [Clear to Auscultation] : lungs were clear to auscultation bilaterally [Regular Rhythm] : with a regular rhythm [Normal S1, S2] : normal S1 and S2 [No Murmur] : no murmur heard [Soft] : abdomen soft [Non-distended] : non-distended [Non Tender] : non-tender [No Masses] : no abdominal mass palpated [Normal Bowel Sounds] : normal bowel sounds [Speech Grossly Normal] : speech grossly normal [Normal Affect] : the affect was normal [Alert and Oriented x3] : oriented to person, place, and time [Normal Insight/Judgement] : insight and judgment were intact

## 2019-11-18 DIAGNOSIS — Z23 ENCOUNTER FOR IMMUNIZATION: ICD-10-CM

## 2019-11-18 DIAGNOSIS — F32.9 MAJOR DEPRESSIVE DISORDER, SINGLE EPISODE, UNSPECIFIED: ICD-10-CM

## 2019-11-18 DIAGNOSIS — Z12.11 ENCOUNTER FOR SCREENING FOR MALIGNANT NEOPLASM OF COLON: ICD-10-CM

## 2019-12-18 ENCOUNTER — APPOINTMENT (OUTPATIENT)
Dept: FAMILY MEDICINE | Facility: HOSPITAL | Age: 49
End: 2019-12-18

## 2020-01-02 ENCOUNTER — FORM ENCOUNTER (OUTPATIENT)
Age: 50
End: 2020-01-02

## 2020-01-03 ENCOUNTER — APPOINTMENT (OUTPATIENT)
Dept: FAMILY MEDICINE | Facility: HOSPITAL | Age: 50
End: 2020-01-03
Payer: COMMERCIAL

## 2020-01-03 ENCOUNTER — OUTPATIENT (OUTPATIENT)
Dept: OUTPATIENT SERVICES | Facility: HOSPITAL | Age: 50
LOS: 1 days | End: 2020-01-03
Payer: COMMERCIAL

## 2020-01-03 VITALS
RESPIRATION RATE: 16 BRPM | TEMPERATURE: 98 F | BODY MASS INDEX: 30.23 KG/M2 | DIASTOLIC BLOOD PRESSURE: 60 MMHG | WEIGHT: 160 LBS | HEART RATE: 71 BPM | SYSTOLIC BLOOD PRESSURE: 93 MMHG | OXYGEN SATURATION: 100 %

## 2020-01-03 DIAGNOSIS — Z00.00 ENCOUNTER FOR GENERAL ADULT MEDICAL EXAMINATION WITHOUT ABNORMAL FINDINGS: ICD-10-CM

## 2020-01-03 DIAGNOSIS — M25.511 PAIN IN RIGHT SHOULDER: ICD-10-CM

## 2020-01-03 DIAGNOSIS — M54.5 LOW BACK PAIN: ICD-10-CM

## 2020-01-03 DIAGNOSIS — Z12.39 ENCOUNTER FOR OTHER SCREENING FOR MALIGNANT NEOPLASM OF BREAST: ICD-10-CM

## 2020-01-03 DIAGNOSIS — Z12.11 ENCOUNTER FOR SCREENING FOR MALIGNANT NEOPLASM OF COLON: ICD-10-CM

## 2020-01-03 PROCEDURE — G0463: CPT

## 2020-01-03 PROCEDURE — 73030 X-RAY EXAM OF SHOULDER: CPT | Mod: 26,RT

## 2020-01-03 PROCEDURE — 73030 X-RAY EXAM OF SHOULDER: CPT

## 2020-01-03 NOTE — REVIEW OF SYSTEMS
[Joint Pain] : joint pain [Joint Stiffness] : no joint stiffness [Joint Swelling] : no joint swelling [Muscle Weakness] : no muscle weakness [Muscle Pain] : muscle pain [Back Pain] : back pain

## 2020-01-03 NOTE — PHYSICAL EXAM
[No Acute Distress] : no acute distress [Well Nourished] : well nourished [Well Developed] : well developed [No CVA Tenderness] : no CVA  tenderness [Well-Appearing] : well-appearing [No Spinal Tenderness] : no spinal tenderness [Kyphosis] : no kyphosis [Scoliosis] : no scoliosis [de-identified] : R shoulder with full ROM, but pain to manipulation. no TTP with paraspinal muscles

## 2020-01-03 NOTE — HISTORY OF PRESENT ILLNESS
[Joint Pain, Localized In The Shoulder] : shoulder [___ Weeks ago] : [unfilled] weeks ago [Constant] : constant [Moderate] : moderate [Stable] : stable [FreeTextEntry5] : has not tried anything [de-identified] : bilateral paraspinal back pain [FreeTextEntry8] : Patient presents acutely with complaints of shoulder pain, after lifting heavy garbage at work as a . Exacerbated by further work, patient felt muscular dull pain in right shoulder and back immediately following incident. Denies bowel/bladder dysfunction, numbness in back/perineum. Full ROM in right arm and shoulder intact. Pain non-radiating 5/10.

## 2020-01-06 DIAGNOSIS — M54.5 LOW BACK PAIN: ICD-10-CM

## 2020-01-06 DIAGNOSIS — M25.511 PAIN IN RIGHT SHOULDER: ICD-10-CM

## 2020-01-18 ENCOUNTER — APPOINTMENT (OUTPATIENT)
Dept: FAMILY MEDICINE | Facility: HOSPITAL | Age: 50
End: 2020-01-18

## 2020-01-18 NOTE — HISTORY OF PRESENT ILLNESS
[FreeTextEntry1] : f/u Shoulder pain [de-identified] : 49F recent visit with Shoulder pain, now presenting for follow up. Patient had not been taking medication when she was called on the phone after her Xray as recent as the 15th. Patient informed to take OTC Aleve at that time.

## 2020-01-26 ENCOUNTER — RX RENEWAL (OUTPATIENT)
Age: 50
End: 2020-01-26

## 2020-02-19 ENCOUNTER — OUTPATIENT (OUTPATIENT)
Dept: OUTPATIENT SERVICES | Facility: HOSPITAL | Age: 50
LOS: 1 days | End: 2020-02-19
Payer: COMMERCIAL

## 2020-02-19 ENCOUNTER — APPOINTMENT (OUTPATIENT)
Dept: RADIOLOGY | Facility: HOSPITAL | Age: 50
End: 2020-02-19
Payer: OTHER MISCELLANEOUS

## 2020-02-19 DIAGNOSIS — Z00.8 ENCOUNTER FOR OTHER GENERAL EXAMINATION: ICD-10-CM

## 2020-02-19 PROCEDURE — 73030 X-RAY EXAM OF SHOULDER: CPT

## 2020-02-19 PROCEDURE — 73030 X-RAY EXAM OF SHOULDER: CPT | Mod: 26,RT

## 2020-06-02 ENCOUNTER — APPOINTMENT (OUTPATIENT)
Dept: FAMILY MEDICINE | Facility: HOSPITAL | Age: 50
End: 2020-06-02

## 2020-06-02 ENCOUNTER — OUTPATIENT (OUTPATIENT)
Dept: OUTPATIENT SERVICES | Facility: HOSPITAL | Age: 50
LOS: 1 days | End: 2020-06-02
Payer: COMMERCIAL

## 2020-06-02 VITALS
OXYGEN SATURATION: 93 % | HEART RATE: 73 BPM | WEIGHT: 160 LBS | TEMPERATURE: 99.4 F | BODY MASS INDEX: 30.21 KG/M2 | SYSTOLIC BLOOD PRESSURE: 97 MMHG | DIASTOLIC BLOOD PRESSURE: 64 MMHG | HEIGHT: 61 IN

## 2020-06-02 DIAGNOSIS — Z29.9 ENCOUNTER FOR PROPHYLACTIC MEASURES, UNSPECIFIED: ICD-10-CM

## 2020-06-02 DIAGNOSIS — M54.2 CERVICALGIA: ICD-10-CM

## 2020-06-02 DIAGNOSIS — M54.9 DORSALGIA, UNSPECIFIED: ICD-10-CM

## 2020-06-02 DIAGNOSIS — Z00.00 ENCOUNTER FOR GENERAL ADULT MEDICAL EXAMINATION WITHOUT ABNORMAL FINDINGS: ICD-10-CM

## 2020-06-02 DIAGNOSIS — Z12.39 ENCOUNTER FOR OTHER SCREENING FOR MALIGNANT NEOPLASM OF BREAST: ICD-10-CM

## 2020-06-02 PROCEDURE — 83036 HEMOGLOBIN GLYCOSYLATED A1C: CPT

## 2020-06-02 PROCEDURE — 80053 COMPREHEN METABOLIC PANEL: CPT

## 2020-06-02 PROCEDURE — 36415 COLL VENOUS BLD VENIPUNCTURE: CPT

## 2020-06-02 PROCEDURE — G0463: CPT

## 2020-06-02 PROCEDURE — 80061 LIPID PANEL: CPT

## 2020-06-02 RX ORDER — CYCLOBENZAPRINE HYDROCHLORIDE 7.5 MG/1
7.5 TABLET, FILM COATED ORAL
Qty: 5 | Refills: 0 | Status: DISCONTINUED | COMMUNITY
Start: 2020-01-03 | End: 2020-06-02

## 2020-06-02 RX ORDER — GLUCOSAMINE/CHONDR SU A SOD 750-600 MG
600-200 TABLET ORAL DAILY
Qty: 90 | Refills: 0 | Status: DISCONTINUED | COMMUNITY
Start: 2020-01-03 | End: 2020-06-02

## 2020-06-02 RX ORDER — RANITIDINE 150 MG/1
150 TABLET ORAL
Qty: 180 | Refills: 3 | Status: DISCONTINUED | COMMUNITY
Start: 2019-06-19 | End: 2020-06-02

## 2020-06-02 RX ORDER — SERTRALINE 25 MG/1
25 TABLET, FILM COATED ORAL
Qty: 30 | Refills: 1 | Status: DISCONTINUED | COMMUNITY
Start: 2020-01-26 | End: 2020-06-02

## 2020-06-02 RX ORDER — NAPROXEN SODIUM 220 MG
220 TABLET ORAL
Qty: 28 | Refills: 0 | Status: DISCONTINUED | COMMUNITY
Start: 2020-01-03 | End: 2020-06-02

## 2020-06-02 NOTE — END OF VISIT
[] : Resident [FreeTextEntry3] : on review of her chart noted that pt had a low k level in her ER visit but afterward pt was having no symptoms. we repeat the test today

## 2020-06-02 NOTE — HISTORY OF PRESENT ILLNESS
[FreeTextEntry8] : 50 year old female with PMHx of depression who p/w c/o back pain x 2 weeks. Pain located in mid-back region on the left. Worse in the morning. Worse with activity. Denies trauma or inciting event. Has not taken OTC medications. Denies paresthesias, weakness, depression.

## 2020-06-02 NOTE — ASSESSMENT
[FreeTextEntry1] : 50 year old female with PMHx of depression who p/w c/o back pain x 2 weeks.\par \par Back pain\par - naproxen\par \par Depression\par - stable\par - continue to follow SW\par - continue sertaline \par \par Healthcare maintenance\par - labs\par - mammogram \par \par F/u in 2 weeks \par D/w Dr. Puente

## 2020-06-02 NOTE — PHYSICAL EXAM
[Normal] : soft, non-tender, non-distended, no masses palpated, no HSM and normal bowel sounds [No CVA Tenderness] : no CVA  tenderness [No Spinal Tenderness] : no spinal tenderness [No Joint Swelling] : no joint swelling [Grossly Normal Strength/Tone] : grossly normal strength/tone [Coordination Grossly Intact] : coordination grossly intact [Normal Gait] : normal gait [Normal Affect] : the affect was normal [Alert and Oriented x3] : oriented to person, place, and time [Normal Mood] : the mood was normal

## 2020-06-03 DIAGNOSIS — M54.9 DORSALGIA, UNSPECIFIED: ICD-10-CM

## 2020-06-05 ENCOUNTER — APPOINTMENT (OUTPATIENT)
Dept: FAMILY MEDICINE | Facility: HOSPITAL | Age: 50
End: 2020-06-05

## 2020-06-16 ENCOUNTER — APPOINTMENT (OUTPATIENT)
Dept: FAMILY MEDICINE | Facility: HOSPITAL | Age: 50
End: 2020-06-16

## 2020-06-29 LAB
ALBUMIN SERPL ELPH-MCNC: 4.9 G/DL
ALP BLD-CCNC: 72 U/L
ALT SERPL-CCNC: 22 U/L
ANION GAP SERPL CALC-SCNC: 10 MMOL/L
AST SERPL-CCNC: 15 U/L
BASOPHILS # BLD AUTO: 0.03 K/UL
BASOPHILS NFR BLD AUTO: 0.7 %
BILIRUB SERPL-MCNC: 0.5 MG/DL
BUN SERPL-MCNC: 15 MG/DL
CALCIUM SERPL-MCNC: 9.9 MG/DL
CHLORIDE SERPL-SCNC: 102 MMOL/L
CO2 SERPL-SCNC: 27 MMOL/L
CREAT SERPL-MCNC: 0.47 MG/DL
EOSINOPHIL # BLD AUTO: 0.02 K/UL
EOSINOPHIL NFR BLD AUTO: 0.4 %
ESTIMATED AVERAGE GLUCOSE: 117 MG/DL
GLUCOSE SERPL-MCNC: 109 MG/DL
HBA1C MFR BLD HPLC: 5.7 %
HCT VFR BLD CALC: 45.9 %
HGB BLD-MCNC: 14.5 G/DL
IMM GRANULOCYTES NFR BLD AUTO: 0.2 %
LYMPHOCYTES # BLD AUTO: 1.24 K/UL
LYMPHOCYTES NFR BLD AUTO: 27.4 %
MAN DIFF?: NORMAL
MCHC RBC-ENTMCNC: 31.3 PG
MCHC RBC-ENTMCNC: 31.6 GM/DL
MCV RBC AUTO: 99.1 FL
MONOCYTES # BLD AUTO: 0.3 K/UL
MONOCYTES NFR BLD AUTO: 6.6 %
NEUTROPHILS # BLD AUTO: 2.92 K/UL
NEUTROPHILS NFR BLD AUTO: 64.7 %
PLATELET # BLD AUTO: 310 K/UL
POTASSIUM SERPL-SCNC: 4.7 MMOL/L
PROT SERPL-MCNC: 7.3 G/DL
RBC # BLD: 4.63 M/UL
RBC # FLD: 12.6 %
SODIUM SERPL-SCNC: 139 MMOL/L
WBC # FLD AUTO: 4.52 K/UL

## 2020-06-29 NOTE — ED ADULT TRIAGE NOTE - CCCP TRG CHIEF CMPLNT
Spoke with [facility]--verified patient picked up Rx 5/23/2020 and again on 6/10/2020--no refills remaining numbness

## 2020-07-09 ENCOUNTER — OUTPATIENT (OUTPATIENT)
Dept: OUTPATIENT SERVICES | Facility: HOSPITAL | Age: 50
LOS: 1 days | End: 2020-07-09
Payer: COMMERCIAL

## 2020-07-09 ENCOUNTER — APPOINTMENT (OUTPATIENT)
Dept: RADIOLOGY | Facility: HOSPITAL | Age: 50
End: 2020-07-09
Payer: COMMERCIAL

## 2020-07-09 DIAGNOSIS — R05 COUGH: ICD-10-CM

## 2020-07-09 PROCEDURE — 71046 X-RAY EXAM CHEST 2 VIEWS: CPT

## 2020-07-09 PROCEDURE — 71046 X-RAY EXAM CHEST 2 VIEWS: CPT | Mod: 26

## 2020-07-14 ENCOUNTER — RESULT REVIEW (OUTPATIENT)
Age: 50
End: 2020-07-14

## 2020-07-14 ENCOUNTER — APPOINTMENT (OUTPATIENT)
Dept: MAMMOGRAPHY | Facility: HOSPITAL | Age: 50
End: 2020-07-14
Payer: COMMERCIAL

## 2020-07-14 ENCOUNTER — OUTPATIENT (OUTPATIENT)
Dept: OUTPATIENT SERVICES | Facility: HOSPITAL | Age: 50
LOS: 1 days | End: 2020-07-14
Payer: COMMERCIAL

## 2020-07-14 DIAGNOSIS — Z12.39 ENCOUNTER FOR OTHER SCREENING FOR MALIGNANT NEOPLASM OF BREAST: ICD-10-CM

## 2020-07-14 PROCEDURE — 77067 SCR MAMMO BI INCL CAD: CPT

## 2020-07-14 PROCEDURE — 77063 BREAST TOMOSYNTHESIS BI: CPT | Mod: 26

## 2020-07-14 PROCEDURE — 77063 BREAST TOMOSYNTHESIS BI: CPT

## 2020-07-14 PROCEDURE — 77067 SCR MAMMO BI INCL CAD: CPT | Mod: 26

## 2020-07-22 ENCOUNTER — APPOINTMENT (OUTPATIENT)
Dept: VASCULAR SURGERY | Facility: CLINIC | Age: 50
End: 2020-07-22
Payer: COMMERCIAL

## 2020-07-22 VITALS
DIASTOLIC BLOOD PRESSURE: 61 MMHG | TEMPERATURE: 97.3 F | BODY MASS INDEX: 30.21 KG/M2 | HEIGHT: 61 IN | WEIGHT: 160 LBS | SYSTOLIC BLOOD PRESSURE: 93 MMHG | HEART RATE: 64 BPM

## 2020-07-22 VITALS — TEMPERATURE: 97.3 F

## 2020-07-22 DIAGNOSIS — E78.00 PURE HYPERCHOLESTEROLEMIA, UNSPECIFIED: ICD-10-CM

## 2020-07-22 PROCEDURE — 93970 EXTREMITY STUDY: CPT

## 2020-07-22 PROCEDURE — 99202 OFFICE O/P NEW SF 15 MIN: CPT

## 2020-07-22 NOTE — HISTORY OF PRESENT ILLNESS
[FreeTextEntry1] : 49 yo female with history of depression/anxiety, hld presents for evaluation of pain in varicose veins for several years left > right.  pt described her discomfort as heavy and stabbing.  \par pt rates the pain as 8/10.  pt states that the pain is present most of the time.   \par pt states that the pain is worse when she is standing and walking for prolonged periods of time \par she is currently working as aide with an elderly lady  she is standing mostly with episodes of sitting \par pt states that elevating the lower extremities improves the pain \par pt denies any history of dvt, ulcers or bleeding from the veins.  \par pt denies any history of claudications \par \par

## 2020-07-22 NOTE — ASSESSMENT
[FreeTextEntry1] : 51 yo female with history of depression/anxiety, hld presents for evaluation of pain in varicose veins for several years left > right.\par \par venous duplex shows insufficency of the right proximal calf and left gsv throughout\par \par at this time would recommend compression stockings and elevation \par pt to follow up in 3 months if still experiencing persistant pain despite the use of compression stockings would recommend ablation with stab phlebectomy

## 2020-07-22 NOTE — PHYSICAL EXAM
[JVD] : no jugular venous distention  [2+] : right 2+ [Varicose Veins Of Lower Extremities] : bilaterally [Ankle Swelling (On Exam)] : not present [Ankle Swelling Bilaterally] : severe [] : not present [Skin Ulcer] : no ulcer [No Rash or Lesion] : No rash or lesion [de-identified] : appears well  [de-identified] : mild calf tenderness bilaterally

## 2020-07-22 NOTE — CONSULT LETTER
[Dear  ___] : Dear  [unfilled], [Consult Letter:] : I had the pleasure of evaluating your patient, [unfilled]. [Please see my note below.] : Please see my note below. [FreeTextEntry3] : Bhargavi Astudillo PA-C\par [facility]\par  [Sincerely,] : Sincerely,

## 2020-07-23 ENCOUNTER — NON-APPOINTMENT (OUTPATIENT)
Age: 50
End: 2020-07-23

## 2020-07-23 ENCOUNTER — APPOINTMENT (OUTPATIENT)
Dept: CARDIOLOGY | Facility: CLINIC | Age: 50
End: 2020-07-23
Payer: COMMERCIAL

## 2020-07-23 VITALS — SYSTOLIC BLOOD PRESSURE: 90 MMHG | DIASTOLIC BLOOD PRESSURE: 60 MMHG | HEART RATE: 64 BPM

## 2020-07-23 VITALS
DIASTOLIC BLOOD PRESSURE: 65 MMHG | HEIGHT: 61 IN | RESPIRATION RATE: 16 BRPM | OXYGEN SATURATION: 96 % | HEART RATE: 65 BPM | SYSTOLIC BLOOD PRESSURE: 91 MMHG | BODY MASS INDEX: 30.21 KG/M2 | WEIGHT: 160 LBS | TEMPERATURE: 98.4 F

## 2020-07-23 DIAGNOSIS — R94.31 ABNORMAL ELECTROCARDIOGRAM [ECG] [EKG]: ICD-10-CM

## 2020-07-23 DIAGNOSIS — R07.9 CHEST PAIN, UNSPECIFIED: ICD-10-CM

## 2020-07-23 PROCEDURE — 93306 TTE W/DOPPLER COMPLETE: CPT

## 2020-07-23 PROCEDURE — 93000 ELECTROCARDIOGRAM COMPLETE: CPT

## 2020-07-23 PROCEDURE — 99204 OFFICE O/P NEW MOD 45 MIN: CPT

## 2020-07-23 NOTE — PHYSICAL EXAM
[General Appearance - Well Developed] : well developed [Normal Appearance] : normal appearance [Well Groomed] : well groomed [No Deformities] : no deformities [General Appearance - Well Nourished] : well nourished [General Appearance - In No Acute Distress] : no acute distress [Normal Oral Mucosa] : normal oral mucosa [No Oral Pallor] : no oral pallor [No Oral Cyanosis] : no oral cyanosis [Normal Jugular Venous A Waves Present] : normal jugular venous A waves present [Normal Jugular Venous V Waves Present] : normal jugular venous V waves present [No Jugular Venous Villalobos A Waves] : no jugular venous villalobos A waves [Normal Rate] : normal [Normal S1] : normal S1 [Normal S2] : normal S2 [S3] : no S3 [S4] : no S4 [No Murmur] : no murmurs heard [Right Carotid Bruit] : no bruit heard over the right carotid [Left Carotid Bruit] : no bruit heard over the left carotid [Right Femoral Bruit] : no bruit heard over the right femoral artery [Left Femoral Bruit] : no bruit heard over the left femoral artery [2+] : left 2+ [No Abnormalities] : the abdominal aorta was not enlarged and no bruit was heard [No Pitting Edema] : no pitting edema present [Respiration, Rhythm And Depth] : normal respiratory rhythm and effort [Auscultation Breath Sounds / Voice Sounds] : lungs were clear to auscultation bilaterally [Exaggerated Use Of Accessory Muscles For Inspiration] : no accessory muscle use [Abdomen Soft] : soft [Abdomen Tenderness] : non-tender [Abdomen Mass (___ Cm)] : no abdominal mass palpated [Gait - Sufficient For Exercise Testing] : the gait was sufficient for exercise testing [Abnormal Walk] : normal gait [Cyanosis, Localized] : no localized cyanosis [Nail Clubbing] : no clubbing of the fingernails [Petechial Hemorrhages (___cm)] : no petechial hemorrhages [Skin Color & Pigmentation] : normal skin color and pigmentation [] : no rash [No Venous Stasis] : no venous stasis [Skin Lesions] : no skin lesions [No Skin Ulcers] : no skin ulcer [No Xanthoma] : no  xanthoma was observed [Oriented To Time, Place, And Person] : oriented to person, place, and time [Affect] : the affect was normal [Mood] : the mood was normal [No Anxiety] : not feeling anxious

## 2020-07-23 NOTE — REASON FOR VISIT
[Consultation] : a consultation regarding [Abnormal ECG] : an abnormal ECG [Chest Pain] : chest pain [FreeTextEntry1] : This is a 50-year-old woman referred for cardiac evaluation. The patient is referred for an abnormal EKG and do an episode of chest pain that she had 3 months ago. She described the episode as a stabbing pain radiating to her back it lasted for approximately 4 hours while she was sitting in a chair. She states it was associated with some shortness of breath but no nausea vomiting or diaphoresis there was no radiation to the neck or arms. She has not had similar pain before or since. The patient has no known history of hypertension diabetes smoking or alcohol use. She does have no known family history of heart disease although her father at age 87 has had an abnormal nuclear stress test. Her most recent lipid profile reveals total cholesterol 287 HDL 73 of the L1 64 and triglycerides 252. The patient reached menopause 2 years ago

## 2020-07-23 NOTE — ASSESSMENT
[FreeTextEntry1] : In summary, this is a young woman with a one time episode of chest pain but with a markedly abnormal EKG.\par \par I advised her to return for echocardiography to assess for any underlying structural heart disease.\par \par I advised to return for exercise stress testing with nuclear imaging to rule out myocardial ischemia.\par \par When the results of these tests are known further recommendations may be forthcoming

## 2020-08-20 ENCOUNTER — APPOINTMENT (OUTPATIENT)
Dept: CARDIOLOGY | Facility: CLINIC | Age: 50
End: 2020-08-20
Payer: COMMERCIAL

## 2020-08-20 PROCEDURE — A9500: CPT

## 2020-08-20 PROCEDURE — 93015 CV STRESS TEST SUPVJ I&R: CPT

## 2020-08-20 PROCEDURE — 78452 HT MUSCLE IMAGE SPECT MULT: CPT

## 2020-09-29 ENCOUNTER — OUTPATIENT (OUTPATIENT)
Dept: OUTPATIENT SERVICES | Facility: HOSPITAL | Age: 50
LOS: 1 days | End: 2020-09-29
Payer: COMMERCIAL

## 2020-09-29 ENCOUNTER — APPOINTMENT (OUTPATIENT)
Dept: CT IMAGING | Facility: HOSPITAL | Age: 50
End: 2020-09-29
Payer: COMMERCIAL

## 2020-09-29 DIAGNOSIS — Z00.8 ENCOUNTER FOR OTHER GENERAL EXAMINATION: ICD-10-CM

## 2020-09-29 PROCEDURE — 70450 CT HEAD/BRAIN W/O DYE: CPT

## 2020-09-29 PROCEDURE — 70450 CT HEAD/BRAIN W/O DYE: CPT | Mod: 26

## 2020-10-20 ENCOUNTER — APPOINTMENT (OUTPATIENT)
Dept: VASCULAR SURGERY | Facility: CLINIC | Age: 50
End: 2020-10-20
Payer: COMMERCIAL

## 2020-10-20 VITALS
DIASTOLIC BLOOD PRESSURE: 63 MMHG | HEART RATE: 63 BPM | SYSTOLIC BLOOD PRESSURE: 98 MMHG | BODY MASS INDEX: 30.21 KG/M2 | HEIGHT: 61 IN | WEIGHT: 160 LBS

## 2020-10-20 VITALS — TEMPERATURE: 96 F

## 2020-10-20 PROCEDURE — 99072 ADDL SUPL MATRL&STAF TM PHE: CPT

## 2020-10-20 PROCEDURE — 99213 OFFICE O/P EST LOW 20 MIN: CPT

## 2020-10-20 NOTE — HISTORY OF PRESENT ILLNESS
[FreeTextEntry1] : 49 yo female with history of depression/anxiety, hld presents for evaluation of pain in varicose veins for several years left > right.  pt described her discomfort as heavy and stabbing.  \par pt rates the pain as 8/10.  pt states that the pain is present most of the time.   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

## 2020-10-20 NOTE — CONSULT LETTER
[Dear  ___] : Dear  [unfilled], [Consult Letter:] : I had the pleasure of evaluating your patient, [unfilled]. [Sincerely,] : Sincerely, [Please see my note below.] : Please see my note below. [FreeTextEntry3] : Bhargavi Astudillo PA-C\par [facility]\par

## 2020-10-20 NOTE — PHYSICAL EXAM
[JVD] : no jugular venous distention  [2+] : left 2+ [Ankle Swelling (On Exam)] : not present [Varicose Veins Of Lower Extremities] : bilaterally [Ankle Swelling Bilaterally] : severe [] : not present [No Rash or Lesion] : No rash or lesion [Skin Ulcer] : no ulcer [de-identified] : appears well  [de-identified] : mild calf tenderness bilaterally

## 2020-10-20 NOTE — ASSESSMENT
[FreeTextEntry1] : 51 yo female with history of depression/anxiety, hld presents for evaluation of pain in varicose veins for several years left > right.\par \par venous duplex shows severe insufficiency of the right proximal calf and left gsv throughout\par \par  still experiencing persistent pain despite the use of compression stockings would recommend ablation with stab phlebectomy

## 2020-10-22 DIAGNOSIS — Z00.00 ENCOUNTER FOR GENERAL ADULT MEDICAL EXAMINATION W/OUT ABNORMAL FINDINGS: ICD-10-CM

## 2020-11-11 PROBLEM — I83.90 VARICOSE VEINS: Status: ACTIVE | Noted: 2020-07-22

## 2020-11-12 ENCOUNTER — APPOINTMENT (OUTPATIENT)
Dept: ENDOVASCULAR SURGERY | Facility: CLINIC | Age: 50
End: 2020-11-12
Payer: COMMERCIAL

## 2020-11-12 ENCOUNTER — LABORATORY RESULT (OUTPATIENT)
Age: 50
End: 2020-11-12

## 2020-11-12 VITALS
HEIGHT: 61 IN | HEART RATE: 61 BPM | WEIGHT: 160 LBS | SYSTOLIC BLOOD PRESSURE: 104 MMHG | OXYGEN SATURATION: 97 % | TEMPERATURE: 98 F | BODY MASS INDEX: 30.21 KG/M2 | DIASTOLIC BLOOD PRESSURE: 68 MMHG | RESPIRATION RATE: 16 BRPM

## 2020-11-12 DIAGNOSIS — I83.90 ASYMPTOMATIC VARICOSE VEINS OF UNSPECIFIED LOWER EXTREMITY: ICD-10-CM

## 2020-11-12 PROCEDURE — 36475 ENDOVENOUS RF 1ST VEIN: CPT | Mod: LT

## 2020-11-12 PROCEDURE — 37765 STAB PHLEB VEINS XTR 10-20: CPT | Mod: LT

## 2020-11-12 PROCEDURE — 99072 ADDL SUPL MATRL&STAF TM PHE: CPT

## 2020-11-12 RX ORDER — ROSUVASTATIN CALCIUM 20 MG/1
20 TABLET, FILM COATED ORAL
Refills: 0 | Status: DISCONTINUED | COMMUNITY
End: 2020-11-12

## 2020-11-12 NOTE — PAST MEDICAL HISTORY
[Increasing age ( >40 years old)] : Increasing age ( >40 years old) [No therapy indicated for cases scheduled for less than one hour] : No therapy indicated for cases scheduled for less than one hour. [Varicose Veins] : Varicose Veins [FreeTextEntry1] : \par \par Malignant Hyperthermis (MH) Screening Tool and Risk of Bleeding Assessement\par Ms. BRANDEE CRAWFORD  denies family history of unexpected death following Anesthesia or Exercise.\par Denies Family history of Malignant Hyperthermia, Muscle or Neuromuscular disorder and High Temperature following exercise.\par \par Ms. BRANDEE CRAWFORD denies history of Muscle Spasm, Dark or Chocolate - Colored urine and Unanticipated fever immediately following anesthesia or serious exercise. \par Ms. CRAWFORD  also denies bleeding tendencies/ Risks of Bleeding\par

## 2020-11-12 NOTE — PROCEDURE
[D/C IV on discharge] : D/C IV on discharge [Resume diet] : resume diet [Dressing checked for bleeding] : Dressing checked for bleeding [Vital signs on admission the q 15 mins x2] : Vital signs on admission the q 15 mins x2 [FreeTextEntry1] : left leg radiofrequency ablation/phlebectomy

## 2020-11-12 NOTE — HISTORY OF PRESENT ILLNESS
[FreeTextEntry1] : alert and oriented x 3 \par accompanied by Keith Plasencia\par  no reported falls \par  covid not detected 11/10/2020\par LMP >2years ago [FreeTextEntry5] : water at 1330  [FreeTextEntry6] : Dr Cochran

## 2020-11-17 ENCOUNTER — APPOINTMENT (OUTPATIENT)
Dept: VASCULAR SURGERY | Facility: CLINIC | Age: 50
End: 2020-11-17
Payer: COMMERCIAL

## 2020-11-17 VITALS — TEMPERATURE: 97.7 F

## 2020-11-17 PROCEDURE — 99024 POSTOP FOLLOW-UP VISIT: CPT

## 2020-11-17 PROCEDURE — 93971 EXTREMITY STUDY: CPT

## 2020-11-17 PROCEDURE — 99072 ADDL SUPL MATRL&STAF TM PHE: CPT

## 2020-11-17 NOTE — PHYSICAL EXAM
[Alert] : alert [JVD] : no jugular venous distention  [Ankle Swelling (On Exam)] : not present [Varicose Veins Of Lower Extremities] : not present [] : not present [de-identified] : appears well  [de-identified] : incision sites clean and dry with surrounding ecchymosis

## 2020-11-17 NOTE — DISCUSSION/SUMMARY
[FreeTextEntry1] : 49 yo female with history of venous insufficiency s/p ablation and stab phlebectomy presents for follow up \par \par duplex shows ablated gsv no evidence of dvt \par pt advised to continue with compression stockings and elevation \par nsaids prn for pain relief \par pt to follow up in 1 month

## 2020-11-17 NOTE — REASON FOR VISIT
[de-identified] : left gsv ablation and stab phlebectomy [de-identified] : pt with pain surrounding incision sites \par she has been wearing her compression stockings and elevating the lower extremities

## 2020-11-24 ENCOUNTER — APPOINTMENT (OUTPATIENT)
Dept: NEUROLOGY | Facility: CLINIC | Age: 50
End: 2020-11-24
Payer: COMMERCIAL

## 2020-11-24 VITALS
WEIGHT: 155 LBS | BODY MASS INDEX: 29.27 KG/M2 | TEMPERATURE: 97 F | OXYGEN SATURATION: 98 % | HEIGHT: 61 IN | HEART RATE: 64 BPM | SYSTOLIC BLOOD PRESSURE: 122 MMHG | DIASTOLIC BLOOD PRESSURE: 78 MMHG

## 2020-11-24 DIAGNOSIS — G44.209 TENSION-TYPE HEADACHE, UNSPECIFIED, NOT INTRACTABLE: ICD-10-CM

## 2020-11-24 DIAGNOSIS — F32.9 MAJOR DEPRESSIVE DISORDER, SINGLE EPISODE, UNSPECIFIED: ICD-10-CM

## 2020-11-24 PROCEDURE — 99214 OFFICE O/P EST MOD 30 MIN: CPT

## 2020-11-24 RX ORDER — MIDODRINE HYDROCHLORIDE 2.5 MG/1
2.5 TABLET ORAL
Refills: 0 | Status: ACTIVE | COMMUNITY

## 2020-11-24 RX ORDER — NAPROXEN 250 MG/1
250 TABLET ORAL TWICE DAILY
Qty: 28 | Refills: 0 | Status: DISCONTINUED | COMMUNITY
Start: 2020-06-02 | End: 2020-11-24

## 2020-11-24 RX ORDER — METOPROLOL TARTRATE 75 MG/1
TABLET, FILM COATED ORAL
Refills: 0 | Status: DISCONTINUED | COMMUNITY
End: 2020-11-24

## 2020-11-24 RX ORDER — ROSUVASTATIN CALCIUM 5 MG/1
5 TABLET, FILM COATED ORAL
Refills: 0 | Status: ACTIVE | COMMUNITY

## 2020-11-24 NOTE — REVIEW OF SYSTEMS
[Feeling Poorly] : feeling poorly [Feeling Tired] : feeling tired [Tension Headache] : tension-type headaches [Facial Weakness] : no facial weakness [Anxiety] : no anxiety [Eyesight Problems] : no eyesight problems [Nosebleeds] : no nosebleeds [Chest Pain] : no chest pain [Cough] : no cough [Constipation] : no constipation [Pelvic Pain] : no pelvic pain [Joint Swelling] : no joint swelling [Itching] : no itching [Muscle Weakness] : no muscle weakness [Swollen Glands] : no swollen glands

## 2020-11-24 NOTE — DISCUSSION/SUMMARY
[FreeTextEntry1] : 50 W who is here for initial consultation of headache that is likely tension headache from her acute stressor of infidelity.  Will have her see a psychiatrist for therapy and treatment of depression and anxiety.  Patient will follow-up with me in 2 months.  Patient was advised to call if her headache worsens before then.  Patient was agreeable to the plan.\par \par More than 50% of time spent on counseling and educate patient on differential, workup, disease course, and treatment/management. Education was provided to the patient during this encounter. All questions and concerns were answered and addressed in detail. The patient verbalized understanding and agreed to plan. Patient was advised to continue to monitor for neurologic symptoms and to notify my office or go to the nearest emergency room if there are any changes. \par Side effects of the above medications were discussed in detail including but not limited to applicable black box warning and teratogenicity as appropriate. \par Patient was advised to bring previous records to my office. \par \par \par

## 2020-11-24 NOTE — HISTORY OF PRESENT ILLNESS
[FreeTextEntry1] : 50-year-old woman who is here for initial consultation of headache for the past year.  Patient has other associated symptoms such as difficulty concentrating, forgetting things, fatigue, insomnia.\par Location: Front of her head and her neck\par Severity 7 out of 10\par Quality throbbing\par Frequency 2-3 times a week\par Duration 1 hour to all day\par Associated symptoms of nausea, lightheadedness, anxiety, photophobia and phonophobia. \par \par Patient states that she cries at times.  Her score on the PHQ–9 depression screen was a 20.  Patient has no thoughts of self-harm or homicide.  Upon further discussion it turns out that her  recently cheated on her.

## 2020-11-24 NOTE — REASON FOR VISIT
[Consultation] : a consultation visit [[employer] ] : provided by [employer]   [FreeTextEntry1] : [id number]

## 2020-11-24 NOTE — PHYSICAL EXAM
[General Appearance - Alert] : alert [Oriented To Time, Place, And Person] : oriented to person, place, and time [Person] : oriented to person [Place] : oriented to place [Time] : oriented to time [Short Term Intact] : short term memory intact [Fluency] : fluency intact [Current Events] : adequate knowledge of current events [Cranial Nerves Optic (II)] : visual acuity intact bilaterally,  visual fields full to confrontation, pupils equal round and reactive to light [Cranial Nerves Oculomotor (III)] : extraocular motion intact [Cranial Nerves Trigeminal (V)] : facial sensation intact symmetrically [Cranial Nerves Facial (VII)] : face symmetrical [Cranial Nerves Vestibulocochlear (VIII)] : hearing was intact bilaterally [Cranial Nerves Glossopharyngeal (IX)] : tongue and palate midline [Cranial Nerves Accessory (XI - Cranial And Spinal)] : head turning and shoulder shrug symmetric [Cranial Nerves Hypoglossal (XII)] : there was no tongue deviation with protrusion [Motor Tone] : muscle tone was normal in all four extremities [Motor Strength] : muscle strength was normal in all four extremities [Sensation Tactile Decrease] : light touch was intact [1+] : Patella left 1+ [Extraocular Movements] : extraocular movements were intact [Hearing Threshold Finger Rub Not Henry] : hearing was normal [Abnormal Walk] : normal gait [] : no rash [Coordination - Dysmetria Impaired Finger-to-Nose Bilateral] : not present

## 2020-12-22 RX ORDER — SERTRALINE HYDROCHLORIDE 50 MG/1
50 TABLET, FILM COATED ORAL
Qty: 30 | Refills: 1 | Status: ACTIVE | COMMUNITY
Start: 2020-12-22 | End: 1900-01-01

## 2020-12-22 RX ORDER — SERTRALINE 25 MG/1
25 TABLET, FILM COATED ORAL DAILY
Qty: 30 | Refills: 1 | Status: ACTIVE | COMMUNITY
Start: 2019-09-09 | End: 1900-01-01

## 2021-01-12 ENCOUNTER — APPOINTMENT (OUTPATIENT)
Dept: VASCULAR SURGERY | Facility: CLINIC | Age: 51
End: 2021-01-12

## 2021-01-19 ENCOUNTER — APPOINTMENT (OUTPATIENT)
Dept: NEUROLOGY | Facility: CLINIC | Age: 51
End: 2021-01-19

## 2021-03-05 NOTE — ED PROVIDER NOTE - PROVIDER TOKENS
"DELETE AFTER REVIEWING: Telephone encounter to be sent to the clinical pool.    Pharmacy Name: [facility] - [place], [place] - [address] [place] [address] [phone] [facility] [phone]      Pharmacy representative name: AMALIA    Pharmacy representative phone number: [phone]    What medication are you calling in regards to: BOTOX    What question does the pharmacy have: WANTING TO CHECK ON STATUS OF PA FOR PT'S BOTOX.    Who is the provider that prescribed the medication: RONDA CORREIA, CHRIS    Additional notes: AMALIA STATES THAT WHEN PULLING UP PATIENT FOR PA WITH [employer], PT'S LAST NAME WILL BE LISTED AT \"REGULO\" AND NOT \"ALANIS\". SHE HAS REQUESTED THE INFORMATION NEEDED BE SUBMITTED VIA [employer] OR TO CALL NUMBER LISTED ABOVE WITH ANY FURTHER QUESTIONS.    PLEASE REVIEW AND ADVISE.  " PROVIDER:[TOKEN:[[id number]]]

## 2021-04-15 LAB
ANION GAP SERPL CALC-SCNC: 14 MMOL/L
BASOPHILS # BLD AUTO: 0.04 K/UL
BASOPHILS NFR BLD AUTO: 0.7 %
BUN SERPL-MCNC: 14 MG/DL
CALCIUM SERPL-MCNC: 9.8 MG/DL
CHLORIDE SERPL-SCNC: 102 MMOL/L
CO2 SERPL-SCNC: 27 MMOL/L
CREAT SERPL-MCNC: 0.62 MG/DL
EOSINOPHIL # BLD AUTO: 0.04 K/UL
EOSINOPHIL NFR BLD AUTO: 0.7 %
GLUCOSE SERPL-MCNC: 144 MG/DL
HCT VFR BLD CALC: 43.9 %
HGB BLD-MCNC: 14 G/DL
IMM GRANULOCYTES NFR BLD AUTO: 0.4 %
LYMPHOCYTES # BLD AUTO: 1.65 K/UL
LYMPHOCYTES NFR BLD AUTO: 29.1 %
MAN DIFF?: NORMAL
MCHC RBC-ENTMCNC: 30.4 PG
MCHC RBC-ENTMCNC: 31.9 GM/DL
MCV RBC AUTO: 95.4 FL
MONOCYTES # BLD AUTO: 0.36 K/UL
MONOCYTES NFR BLD AUTO: 6.3 %
NEUTROPHILS # BLD AUTO: 3.56 K/UL
NEUTROPHILS NFR BLD AUTO: 62.8 %
PLATELET # BLD AUTO: 294 K/UL
POTASSIUM SERPL-SCNC: 4 MMOL/L
RBC # BLD: 4.6 M/UL
RBC # FLD: 12.1 %
SODIUM SERPL-SCNC: 143 MMOL/L
WBC # FLD AUTO: 5.67 K/UL

## 2021-05-03 LAB
CHOLEST SERPL-MCNC: 287 MG/DL
CHOLEST/HDLC SERPL: 3.9 RATIO
HDLC SERPL-MCNC: 73 MG/DL
LDLC SERPL CALC-MCNC: 164 MG/DL
TRIGL SERPL-MCNC: 252 MG/DL

## 2022-12-15 NOTE — ED PROVIDER NOTE - RESPIRATORY, MLM
Patient is asking to speak to Dr Marichuy Brumfield's nurse. Please call back.   Breath sounds clear and equal bilaterally.

## 2023-01-30 NOTE — ED PROVIDER NOTE - NS_EDPROVIDERDISPOUSERTYPE_ED_A_ED
Anesthesia Evaluation                  Airway   Mallampati: I  TM distance: >3 FB  Neck ROM: full  No difficulty expected  Dental      Pulmonary    (+) COPD, asthma,shortness of breath, sleep apnea,   Cardiovascular     ECG reviewed    (+) hypertension, hyperlipidemia,     ROS comment: Echo and stress are normal    Neuro/Psych  (+) CVA, headaches, numbness,    GI/Hepatic/Renal/Endo    (+) obesity,  hiatal hernia, GERD,  renal disease, diabetes mellitus, thyroid problem hypothyroidism    Musculoskeletal     (+) myalgias,   Abdominal    Substance History      OB/GYN          Other   arthritis,                      Anesthesia Plan    ASA 3     general     intravenous induction     Anesthetic plan, risks, benefits, and alternatives have been provided, discussed and informed consent has been obtained with: patient.    Plan discussed with CRNA.        CODE STATUS:       
Attending Attestation (For Attendings USE Only)...